# Patient Record
Sex: MALE | Race: WHITE | NOT HISPANIC OR LATINO | Employment: FULL TIME | URBAN - METROPOLITAN AREA
[De-identification: names, ages, dates, MRNs, and addresses within clinical notes are randomized per-mention and may not be internally consistent; named-entity substitution may affect disease eponyms.]

---
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
## 2019-03-19 ENCOUNTER — OFFICE VISIT (OUTPATIENT)
Dept: FAMILY MEDICINE CLINIC | Facility: CLINIC | Age: 19
End: 2019-03-19
Payer: COMMERCIAL

## 2019-03-19 VITALS
WEIGHT: 140.6 LBS | SYSTOLIC BLOOD PRESSURE: 108 MMHG | TEMPERATURE: 99.5 F | HEIGHT: 72 IN | DIASTOLIC BLOOD PRESSURE: 62 MMHG | BODY MASS INDEX: 19.05 KG/M2

## 2019-03-19 DIAGNOSIS — R05.9 COUGH: ICD-10-CM

## 2019-03-19 DIAGNOSIS — R09.81 NASAL CONGESTION: Primary | ICD-10-CM

## 2019-03-19 DIAGNOSIS — J06.9 UPPER RESPIRATORY TRACT INFECTION, UNSPECIFIED TYPE: ICD-10-CM

## 2019-03-19 PROCEDURE — 1036F TOBACCO NON-USER: CPT | Performed by: FAMILY MEDICINE

## 2019-03-19 PROCEDURE — 99213 OFFICE O/P EST LOW 20 MIN: CPT | Performed by: FAMILY MEDICINE

## 2019-03-19 PROCEDURE — 3008F BODY MASS INDEX DOCD: CPT | Performed by: FAMILY MEDICINE

## 2019-03-19 RX ORDER — FLUTICASONE PROPIONATE 50 MCG
2 SPRAY, SUSPENSION (ML) NASAL DAILY
Qty: 1 BOTTLE | Refills: 1 | Status: SHIPPED | OUTPATIENT
Start: 2019-03-19 | End: 2020-11-04

## 2019-03-19 RX ORDER — AZITHROMYCIN 500 MG/1
500 TABLET, FILM COATED ORAL DAILY
Qty: 3 TABLET | Refills: 0 | Status: SHIPPED | OUTPATIENT
Start: 2019-03-19 | End: 2019-03-22

## 2019-03-19 NOTE — LETTER
March 19, 2019     Patient: Suzette Dumont   YOB: 2000   Date of Visit: 3/19/2019       To Whom it May Concern:    Suzette Dumont is under my professional care  He was seen in my office on 3/19/2019  He may return to work on 03/20/2019  If you have any questions or concerns, please don't hesitate to call           Sincerely,          Ugo Gayle DO        CC: No Recipients

## 2019-03-19 NOTE — PROGRESS NOTES
Assessment/Plan:  Chief Complaint   Patient presents with    Cold Like Symptoms    Nasal Congestion    Cough     at night when sleeing     Patient Instructions   Rest and fluids, start abx and fluticasone prn congestion  May use Dimetapp DM cold and cough  No problem-specific Assessment & Plan notes found for this encounter  Diagnoses and all orders for this visit:    Nasal congestion  -     fluticasone (FLONASE) 50 mcg/act nasal spray; 2 sprays into each nostril daily    Cough  -     azithromycin (ZITHROMAX) 500 MG tablet; Take 1 tablet (500 mg total) by mouth daily for 3 days    Upper respiratory tract infection, unspecified type  -     azithromycin (ZITHROMAX) 500 MG tablet; Take 1 tablet (500 mg total) by mouth daily for 3 days          Subjective:      Patient ID: Rexford Kawasaki is a 20 y o  male  Cold Like Symptoms   Nasal Congestion   Cough (at night when sleeing)    Started last Sunday, mother and brother sick also  No cp or sob, or ha  The following portions of the patient's history were reviewed and updated as appropriate: allergies, current medications, past family history, past medical history, past social history, past surgical history and problem list     Review of Systems   Constitutional: Negative  HENT: Positive for congestion  Eyes: Negative  Respiratory: Positive for cough  Cardiovascular: Negative  Gastrointestinal: Negative  Endocrine: Negative  Genitourinary: Negative  Musculoskeletal: Negative  Skin: Negative  Allergic/Immunologic: Negative  Neurological: Negative  Hematological: Negative  Psychiatric/Behavioral: Negative  Objective:      /62   Temp 99 5 °F (37 5 °C)   Ht 5' 11 5" (1 816 m)   Wt 63 8 kg (140 lb 9 6 oz)   BMI 19 34 kg/m²          Physical Exam   Constitutional: He is oriented to person, place, and time  He appears well-developed and well-nourished  HENT:   Head: Normocephalic and atraumatic  Right Ear: External ear normal    Left Ear: External ear normal    Nasal congestion and cough     Eyes: Pupils are equal, round, and reactive to light  Conjunctivae and EOM are normal    Neck: Normal range of motion  Neck supple  Cardiovascular: Normal rate, regular rhythm, normal heart sounds and intact distal pulses  Pulmonary/Chest: Effort normal and breath sounds normal    Cough     Musculoskeletal: Normal range of motion  Neurological: He is alert and oriented to person, place, and time  He has normal reflexes  Skin: Skin is warm and dry  Psychiatric: He has a normal mood and affect   His behavior is normal

## 2019-09-30 ENCOUNTER — DOCUMENTATION (OUTPATIENT)
Dept: FAMILY MEDICINE CLINIC | Facility: CLINIC | Age: 19
End: 2019-09-30

## 2019-09-30 ENCOUNTER — OFFICE VISIT (OUTPATIENT)
Dept: FAMILY MEDICINE CLINIC | Facility: CLINIC | Age: 19
End: 2019-09-30
Payer: COMMERCIAL

## 2019-09-30 VITALS
SYSTOLIC BLOOD PRESSURE: 128 MMHG | HEIGHT: 71 IN | TEMPERATURE: 98.8 F | WEIGHT: 144.8 LBS | DIASTOLIC BLOOD PRESSURE: 72 MMHG | HEART RATE: 64 BPM | OXYGEN SATURATION: 98 % | BODY MASS INDEX: 20.27 KG/M2

## 2019-09-30 DIAGNOSIS — R09.82 PND (POST-NASAL DRIP): ICD-10-CM

## 2019-09-30 DIAGNOSIS — R09.81 NASAL CONGESTION: ICD-10-CM

## 2019-09-30 DIAGNOSIS — J06.9 UPPER RESPIRATORY TRACT INFECTION, UNSPECIFIED TYPE: Primary | ICD-10-CM

## 2019-09-30 PROCEDURE — 99213 OFFICE O/P EST LOW 20 MIN: CPT | Performed by: FAMILY MEDICINE

## 2019-09-30 PROCEDURE — 3008F BODY MASS INDEX DOCD: CPT | Performed by: FAMILY MEDICINE

## 2019-09-30 RX ORDER — AZITHROMYCIN 250 MG/1
TABLET, FILM COATED ORAL
Qty: 6 TABLET | Refills: 0 | Status: SHIPPED | OUTPATIENT
Start: 2019-09-30 | End: 2019-10-05

## 2019-09-30 NOTE — PATIENT INSTRUCTIONS
Rest and fluids and also rec Dimetapp DM cold and cough prn and use abx if not better  Call if any problems

## 2019-09-30 NOTE — PROGRESS NOTES
Assessment/Plan:  Chief Complaint   Patient presents with    Nasal Congestion     Pt states he has had post- nasal drip and states he had lost voice  Offered flu, pt declined      Patient Instructions   Rest and fluids and also rec Dimetapp DM cold and cough prn and use abx if not better  Call if any problems  No problem-specific Assessment & Plan notes found for this encounter  Diagnoses and all orders for this visit:    Upper respiratory tract infection, unspecified type  -     azithromycin (ZITHROMAX) 250 mg tablet; Take 2 tablets today then 1 tablet daily x 4 days    PND (post-nasal drip)    Nasal congestion          Subjective:      Patient ID: Kelsey Goldsmith is a 20 y o  male  Nasal Congestion (Pt states he has had post- nasal drip and states he had lost voice  Offered flu, pt declined ) Girlfriend sick also  No cp or sob, or ha  The following portions of the patient's history were reviewed and updated as appropriate: allergies, current medications, past family history, past medical history, past social history, past surgical history and problem list     Review of Systems   Constitutional: Negative  HENT: Positive for congestion and postnasal drip  Eyes: Negative  Respiratory: Positive for cough  Cardiovascular: Negative  Gastrointestinal: Negative  Endocrine: Negative  Genitourinary: Negative  Musculoskeletal: Negative  Skin: Negative  Allergic/Immunologic: Negative  Neurological: Negative  Hematological: Negative  Psychiatric/Behavioral: Negative  Objective:      /72 (BP Location: Left arm, Patient Position: Sitting, Cuff Size: Adult)   Pulse 64   Temp 98 8 °F (37 1 °C) (Temporal)   Ht 5' 11" (1 803 m)   Wt 65 7 kg (144 lb 12 8 oz)   SpO2 98%   BMI 20 20 kg/m²          Physical Exam   Constitutional: He is oriented to person, place, and time  He appears well-developed and well-nourished     HENT:   Head: Normocephalic and atraumatic  Right Ear: External ear normal    Left Ear: External ear normal    Nasal congestion and PND   Eyes: Pupils are equal, round, and reactive to light  Conjunctivae and EOM are normal    Neck: Normal range of motion  Neck supple  Cardiovascular: Normal rate, regular rhythm, normal heart sounds and intact distal pulses  Pulmonary/Chest: Effort normal and breath sounds normal    Musculoskeletal: Normal range of motion  Neurological: He is alert and oriented to person, place, and time  He has normal reflexes  Skin: Skin is warm and dry  Psychiatric: He has a normal mood and affect   His behavior is normal

## 2019-12-23 ENCOUNTER — TELEPHONE (OUTPATIENT)
Dept: FAMILY MEDICINE CLINIC | Facility: CLINIC | Age: 19
End: 2019-12-23

## 2019-12-23 ENCOUNTER — OFFICE VISIT (OUTPATIENT)
Dept: FAMILY MEDICINE CLINIC | Facility: CLINIC | Age: 19
End: 2019-12-23
Payer: COMMERCIAL

## 2019-12-23 VITALS
TEMPERATURE: 100.3 F | BODY MASS INDEX: 20.58 KG/M2 | OXYGEN SATURATION: 98 % | WEIGHT: 147 LBS | HEIGHT: 71 IN | DIASTOLIC BLOOD PRESSURE: 70 MMHG | RESPIRATION RATE: 16 BRPM | SYSTOLIC BLOOD PRESSURE: 126 MMHG | HEART RATE: 86 BPM

## 2019-12-23 DIAGNOSIS — R51.9 NONINTRACTABLE HEADACHE, UNSPECIFIED CHRONICITY PATTERN, UNSPECIFIED HEADACHE TYPE: ICD-10-CM

## 2019-12-23 DIAGNOSIS — F90.9 ATTENTION DEFICIT HYPERACTIVITY DISORDER (ADHD), UNSPECIFIED ADHD TYPE: ICD-10-CM

## 2019-12-23 DIAGNOSIS — Z28.21 VACCINATION REFUSED BY PATIENT: Primary | ICD-10-CM

## 2019-12-23 DIAGNOSIS — R42 DIZZINESS: ICD-10-CM

## 2019-12-23 PROCEDURE — 1036F TOBACCO NON-USER: CPT | Performed by: FAMILY MEDICINE

## 2019-12-23 PROCEDURE — 99214 OFFICE O/P EST MOD 30 MIN: CPT | Performed by: FAMILY MEDICINE

## 2019-12-23 PROCEDURE — 3008F BODY MASS INDEX DOCD: CPT | Performed by: FAMILY MEDICINE

## 2019-12-23 RX ORDER — MECLIZINE HYDROCHLORIDE 25 MG/1
25 TABLET ORAL DAILY PRN
Qty: 14 TABLET | Refills: 0 | Status: SHIPPED | OUTPATIENT
Start: 2019-12-23 | End: 2020-11-04

## 2019-12-23 RX ORDER — BUTALBITAL, ACETAMINOPHEN AND CAFFEINE 50; 325; 40 MG/1; MG/1; MG/1
1 TABLET ORAL DAILY PRN
Qty: 14 TABLET | Refills: 0 | Status: SHIPPED | OUTPATIENT
Start: 2019-12-23 | End: 2021-04-23

## 2019-12-23 RX ORDER — DEXTROAMPHETAMINE SACCHARATE, AMPHETAMINE ASPARTATE MONOHYDRATE, DEXTROAMPHETAMINE SULFATE AND AMPHETAMINE SULFATE 5; 5; 5; 5 MG/1; MG/1; MG/1; MG/1
20 CAPSULE, EXTENDED RELEASE ORAL EVERY MORNING
Qty: 30 CAPSULE | Refills: 0 | Status: SHIPPED | OUTPATIENT
Start: 2019-12-23 | End: 2020-01-27 | Stop reason: SDUPTHER

## 2019-12-23 NOTE — PATIENT INSTRUCTIONS
Rest and fluids, start adhd med at 20 mg daily and also trial of esgic plus and trial of meclizine and get labs as directed and recheck in 1 month  Stay well hydrated  Recheck in 1 mionth for f-up

## 2019-12-23 NOTE — PROGRESS NOTES
Assessment/Plan:  Chief Complaint   Patient presents with    Headache     times a couple weeks     Dizziness     Pt states that every time he eats he feels like he is going to pass out and the room starts spinning     Medication Management     Pt would like to discuss going back on Adderrall      Patient Instructions   Rest and fluids, start adhd med at 20 mg daily and also trial of esgic plus and trial of meclizine and get labs as directed and recheck in 1 month  Stay well hydrated  Recheck in 1 mionth for f-up  No problem-specific Assessment & Plan notes found for this encounter  Diagnoses and all orders for this visit:    Vaccination refused by patient  -     influenza vaccine, [id number], quadrivalent, 0 5 mL, preservative-free, for adult and pediatric patients 6 mos+ (AFLURIA, FLUARIX, FLULAVAL, FLUZONE)    Attention deficit hyperactivity disorder (ADHD), unspecified ADHD type  -     amphetamine-dextroamphetamine (ADDERALL XR) 20 MG 24 hr capsule; Take 1 capsule (20 mg total) by mouth every morningMax Daily Amount: 20 mg    Nonintractable headache, unspecified chronicity pattern, unspecified headache type  -     butalbital-acetaminophen-caffeine (FIORICET,ESGIC) -40 mg per tablet; Take 1 tablet by mouth daily as needed for headaches  -     Comprehensive metabolic panel; Future  -     CBC and differential; Future    Dizziness  -     Comprehensive metabolic panel; Future  -     CBC and differential; Future  -     meclizine (ANTIVERT) 25 mg tablet; Take 1 tablet (25 mg total) by mouth daily as needed for dizziness          Subjective:      Patient ID: Malachiwkeny Villanueva is a 20 y o  male  Headache (times a couple weeks )  Dizziness (Pt states that every time he eats he feels like he is going to pass out and the room starts spinning )  Medication Management (Pt would like to discuss going back on Adderrall )    Having focus issues at work recently and would like to restart Adderall XR again   No cp or sob  The following portions of the patient's history were reviewed and updated as appropriate: allergies, current medications, past family history, past medical history, past social history, past surgical history and problem list     Review of Systems   Constitutional: Negative  HENT: Negative  Eyes: Negative  Respiratory: Negative  Cardiovascular: Negative  Gastrointestinal: Negative  Endocrine: Negative  Genitourinary: Negative  Musculoskeletal: Negative  Skin: Negative  Allergic/Immunologic: Negative  Neurological: Positive for dizziness and headaches  Hematological: Negative  Psychiatric/Behavioral:        Adhd         Objective:      /70   Pulse 86   Temp 100 3 °F (37 9 °C) (Temporal)   Resp 16   Ht 5' 11" (1 803 m)   Wt 66 7 kg (147 lb)   SpO2 98%   BMI 20 50 kg/m²          Physical Exam   Constitutional: He is oriented to person, place, and time  He appears well-developed and well-nourished  HENT:   Head: Normocephalic and atraumatic  Right Ear: External ear normal    Left Ear: External ear normal    Nose: Nose normal    Mouth/Throat: Oropharynx is clear and moist    Eyes: Pupils are equal, round, and reactive to light  Conjunctivae and EOM are normal    Neck: Normal range of motion  Neck supple  Cardiovascular: Normal rate, regular rhythm, normal heart sounds and intact distal pulses  Pulmonary/Chest: Effort normal and breath sounds normal    Musculoskeletal: Normal range of motion  Neurological: He is alert and oriented to person, place, and time  He has normal reflexes  Skin: Skin is warm and dry  Psychiatric: He has a normal mood and affect   His behavior is normal    adhd

## 2019-12-24 LAB
ALBUMIN SERPL-MCNC: 4.9 G/DL (ref 3.6–5.1)
ALBUMIN/GLOB SERPL: 2.3 (CALC) (ref 1–2.5)
ALP SERPL-CCNC: 77 U/L (ref 40–115)
ALT SERPL-CCNC: 10 U/L (ref 9–46)
AST SERPL-CCNC: 15 U/L (ref 10–40)
BASOPHILS # BLD AUTO: 63 CELLS/UL (ref 0–200)
BASOPHILS NFR BLD AUTO: 0.9 %
BILIRUB SERPL-MCNC: 0.6 MG/DL (ref 0.2–1.2)
BUN SERPL-MCNC: 9 MG/DL (ref 7–25)
BUN/CREAT SERPL: NORMAL (CALC) (ref 6–22)
CALCIUM SERPL-MCNC: 9.6 MG/DL (ref 8.6–10.3)
CHLORIDE SERPL-SCNC: 104 MMOL/L (ref 98–110)
CO2 SERPL-SCNC: 26 MMOL/L (ref 20–32)
CREAT SERPL-MCNC: 0.89 MG/DL (ref 0.6–1.35)
EOSINOPHIL # BLD AUTO: 112 CELLS/UL (ref 15–500)
EOSINOPHIL NFR BLD AUTO: 1.6 %
ERYTHROCYTE [DISTWIDTH] IN BLOOD BY AUTOMATED COUNT: 12.8 % (ref 11–15)
GLOBULIN SER CALC-MCNC: 2.1 G/DL (CALC) (ref 1.9–3.7)
GLUCOSE SERPL-MCNC: 80 MG/DL (ref 65–99)
HCT VFR BLD AUTO: 42.2 % (ref 38.5–50)
HGB BLD-MCNC: 14.5 G/DL (ref 13.2–17.1)
LYMPHOCYTES # BLD AUTO: 1022 CELLS/UL (ref 850–3900)
LYMPHOCYTES NFR BLD AUTO: 14.6 %
MCH RBC QN AUTO: 30.5 PG (ref 27–33)
MCHC RBC AUTO-ENTMCNC: 34.4 G/DL (ref 32–36)
MCV RBC AUTO: 88.8 FL (ref 80–100)
MONOCYTES # BLD AUTO: 511 CELLS/UL (ref 200–950)
MONOCYTES NFR BLD AUTO: 7.3 %
NEUTROPHILS # BLD AUTO: 5292 CELLS/UL (ref 1500–7800)
NEUTROPHILS NFR BLD AUTO: 75.6 %
PLATELET # BLD AUTO: 202 THOUSAND/UL (ref 140–400)
PMV BLD REES-ECKER: 10.2 FL (ref 7.5–12.5)
POTASSIUM SERPL-SCNC: 4.1 MMOL/L (ref 3.5–5.3)
PROT SERPL-MCNC: 7 G/DL (ref 6.1–8.1)
RBC # BLD AUTO: 4.75 MILLION/UL (ref 4.2–5.8)
SL AMB EGFR AFRICAN AMERICAN: 143 ML/MIN/1.73M2
SL AMB EGFR NON AFRICAN AMERICAN: 123 ML/MIN/1.73M2
SODIUM SERPL-SCNC: 140 MMOL/L (ref 135–146)
WBC # BLD AUTO: 7 THOUSAND/UL (ref 3.8–10.8)

## 2020-01-16 ENCOUNTER — OFFICE VISIT (OUTPATIENT)
Dept: FAMILY MEDICINE CLINIC | Facility: CLINIC | Age: 20
End: 2020-01-16
Payer: COMMERCIAL

## 2020-01-16 VITALS
WEIGHT: 139.8 LBS | HEIGHT: 72 IN | TEMPERATURE: 101.1 F | OXYGEN SATURATION: 97 % | HEART RATE: 87 BPM | RESPIRATION RATE: 17 BRPM | SYSTOLIC BLOOD PRESSURE: 136 MMHG | DIASTOLIC BLOOD PRESSURE: 76 MMHG | BODY MASS INDEX: 18.93 KG/M2

## 2020-01-16 DIAGNOSIS — R53.83 FATIGUE, UNSPECIFIED TYPE: ICD-10-CM

## 2020-01-16 DIAGNOSIS — R50.9 FEVER, UNSPECIFIED FEVER CAUSE: ICD-10-CM

## 2020-01-16 DIAGNOSIS — R68.89 FLU-LIKE SYMPTOMS: ICD-10-CM

## 2020-01-16 DIAGNOSIS — R52 BODY ACHES: ICD-10-CM

## 2020-01-16 DIAGNOSIS — R05.9 COUGH: Primary | ICD-10-CM

## 2020-01-16 PROCEDURE — 1036F TOBACCO NON-USER: CPT | Performed by: FAMILY MEDICINE

## 2020-01-16 PROCEDURE — 3008F BODY MASS INDEX DOCD: CPT | Performed by: FAMILY MEDICINE

## 2020-01-16 PROCEDURE — 99214 OFFICE O/P EST MOD 30 MIN: CPT | Performed by: FAMILY MEDICINE

## 2020-01-16 RX ORDER — AZITHROMYCIN 250 MG/1
TABLET, FILM COATED ORAL
Qty: 6 TABLET | Refills: 0 | Status: SHIPPED | OUTPATIENT
Start: 2020-01-16 | End: 2020-01-21

## 2020-01-16 RX ORDER — OSELTAMIVIR PHOSPHATE 75 MG/1
75 CAPSULE ORAL EVERY 12 HOURS SCHEDULED
Qty: 10 CAPSULE | Refills: 0 | Status: SHIPPED | OUTPATIENT
Start: 2020-01-16 | End: 2020-01-21

## 2020-01-16 NOTE — PATIENT INSTRUCTIONS
Rest and fluids, start abx and Tamiflu and also rec Dimetapp DM cold and cough prn  Tylenol or advil prn body aches or fever  Call if worse

## 2020-01-16 NOTE — PROGRESS NOTES
Assessment/Plan:  Chief Complaint   Patient presents with    Cold Like Symptoms     Pt c/o body aches and sweats followed by fatigue x4 days  Pt states he did have nausea and vomiting which has subsided  Patient Instructions   Rest and fluids, start abx and Tamiflu and also rec Dimetapp DM cold and cough prn  Tylenol or advil prn body aches or fever  Call if worse  No problem-specific Assessment & Plan notes found for this encounter  Diagnoses and all orders for this visit:    Cough  -     azithromycin (ZITHROMAX) 250 mg tablet; Take 2 tablets today then 1 tablet daily x 4 days    Flu-like symptoms  -     oseltamivir (TAMIFLU) 75 mg capsule; Take 1 capsule (75 mg total) by mouth every 12 (twelve) hours for 5 days    Fever, unspecified fever cause    Fatigue, unspecified type    Body aches          Subjective:      Patient ID: Rufus Pack is a 23 y o  male  Cold Like Symptoms (Pt c/o body aches and sweats followed by fatigue x4 days  Pt states he did have nausea and vomiting which has subsided  ) Flu like symptoms and cough and fever, body aches  The following portions of the patient's history were reviewed and updated as appropriate: allergies, current medications, past family history, past medical history, past social history, past surgical history and problem list     Review of Systems   Constitutional: Positive for fatigue and fever  Sweats   HENT: Negative  Eyes: Negative  Respiratory: Positive for cough  Cardiovascular: Negative  Gastrointestinal: Negative  Endocrine: Negative  Genitourinary: Negative  Musculoskeletal:        Body aches   Skin: Negative  Allergic/Immunologic: Negative  Neurological: Negative  Hematological: Negative  Psychiatric/Behavioral: Negative            Objective:      /76 (BP Location: Left arm, Patient Position: Sitting, Cuff Size: Adult)   Pulse 87   Temp (!) 101 1 °F (38 4 °C) (Temporal)   Resp 17   Ht 5' 11 65" (1 82 m)   Wt 63 4 kg (139 lb 12 8 oz)   SpO2 97%   BMI 19 14 kg/m²          Physical Exam   Constitutional: He is oriented to person, place, and time  He appears well-developed and well-nourished  fatigued   HENT:   Head: Normocephalic and atraumatic  Right Ear: External ear normal    Left Ear: External ear normal    Nose: Nose normal    Mouth/Throat: Oropharynx is clear and moist    Eyes: Pupils are equal, round, and reactive to light  Conjunctivae and EOM are normal    Neck: Normal range of motion  Neck supple  Cardiovascular: Normal rate, regular rhythm, normal heart sounds and intact distal pulses  Pulmonary/Chest: Effort normal and breath sounds normal    cough   Musculoskeletal: Normal range of motion  Neurological: He is alert and oriented to person, place, and time  He has normal reflexes  Skin: Skin is warm and dry  Psychiatric: He has a normal mood and affect   His behavior is normal

## 2020-01-27 DIAGNOSIS — F90.9 ATTENTION DEFICIT HYPERACTIVITY DISORDER (ADHD), UNSPECIFIED ADHD TYPE: ICD-10-CM

## 2020-01-27 RX ORDER — DEXTROAMPHETAMINE SACCHARATE, AMPHETAMINE ASPARTATE MONOHYDRATE, DEXTROAMPHETAMINE SULFATE AND AMPHETAMINE SULFATE 5; 5; 5; 5 MG/1; MG/1; MG/1; MG/1
20 CAPSULE, EXTENDED RELEASE ORAL EVERY MORNING
Qty: 30 CAPSULE | Refills: 0 | Status: SHIPPED | OUTPATIENT
Start: 2020-01-27 | End: 2020-02-27 | Stop reason: SDUPTHER

## 2020-02-27 DIAGNOSIS — F90.9 ATTENTION DEFICIT HYPERACTIVITY DISORDER (ADHD), UNSPECIFIED ADHD TYPE: ICD-10-CM

## 2020-02-27 RX ORDER — DEXTROAMPHETAMINE SACCHARATE, AMPHETAMINE ASPARTATE MONOHYDRATE, DEXTROAMPHETAMINE SULFATE AND AMPHETAMINE SULFATE 5; 5; 5; 5 MG/1; MG/1; MG/1; MG/1
20 CAPSULE, EXTENDED RELEASE ORAL EVERY MORNING
Qty: 30 CAPSULE | Refills: 0 | Status: SHIPPED | OUTPATIENT
Start: 2020-02-27 | End: 2020-03-27 | Stop reason: SDUPTHER

## 2020-03-04 ENCOUNTER — OFFICE VISIT (OUTPATIENT)
Dept: FAMILY MEDICINE CLINIC | Facility: CLINIC | Age: 20
End: 2020-03-04
Payer: COMMERCIAL

## 2020-03-04 VITALS
HEART RATE: 73 BPM | BODY MASS INDEX: 18.26 KG/M2 | OXYGEN SATURATION: 99 % | HEIGHT: 72 IN | TEMPERATURE: 98.8 F | WEIGHT: 134.8 LBS | SYSTOLIC BLOOD PRESSURE: 122 MMHG | DIASTOLIC BLOOD PRESSURE: 76 MMHG | RESPIRATION RATE: 18 BRPM

## 2020-03-04 DIAGNOSIS — F90.9 ATTENTION DEFICIT HYPERACTIVITY DISORDER (ADHD), UNSPECIFIED ADHD TYPE: ICD-10-CM

## 2020-03-04 DIAGNOSIS — M79.642 HAND PAIN, LEFT: ICD-10-CM

## 2020-03-04 DIAGNOSIS — R42 DIZZINESS: Primary | ICD-10-CM

## 2020-03-04 DIAGNOSIS — J06.9 UPPER RESPIRATORY TRACT INFECTION, UNSPECIFIED TYPE: ICD-10-CM

## 2020-03-04 DIAGNOSIS — Z00.00 HEALTH CARE MAINTENANCE: ICD-10-CM

## 2020-03-04 DIAGNOSIS — R19.7 DIARRHEA, UNSPECIFIED TYPE: ICD-10-CM

## 2020-03-04 PROCEDURE — 99395 PREV VISIT EST AGE 18-39: CPT | Performed by: FAMILY MEDICINE

## 2020-03-04 RX ORDER — PREDNISONE 10 MG/1
TABLET ORAL
Qty: 21 TABLET | Refills: 0 | Status: SHIPPED | OUTPATIENT
Start: 2020-03-04 | End: 2020-11-04

## 2020-03-04 RX ORDER — MECLIZINE HYDROCHLORIDE 25 MG/1
25 TABLET ORAL DAILY PRN
Qty: 30 TABLET | Refills: 0 | Status: SHIPPED | OUTPATIENT
Start: 2020-03-04 | End: 2020-07-19

## 2020-03-04 RX ORDER — AZITHROMYCIN 250 MG/1
TABLET, FILM COATED ORAL
Qty: 6 TABLET | Refills: 0 | Status: SHIPPED | OUTPATIENT
Start: 2020-03-04 | End: 2020-03-09

## 2020-03-04 NOTE — PATIENT INSTRUCTIONS
Here for general PE and left hand pain and swelling as well as URI with nasal congestion and dizziness and diarhhea  Start prednisone and abx as directed and use meclizine once daily prn dizziness  May use Dimetapp DM cold and cough prn URI complaints and Pepto Bismol prn diarrhea  Recheck yearly  Recent CMP and CBC wnl  Pt  Prefers to wait for flu shot in fall 2020  Call office if interested in flu shot for this season  Monitor dizziness and call if not better in 1 week with hand pain or dizziness

## 2020-03-04 NOTE — PROGRESS NOTES
Assessment/Plan:  Chief Complaint   Patient presents with    Physical Exam     Pt presents for a physical exam  Pt states he would like to discuss L/hand swelling and redness   Dizziness     Pt c/o dizziness since Monday  Patient Instructions   Here for general PE and left hand pain and swelling as well as URI with nasal congestion and dizziness and diarhhea  Start prednisone and abx as directed and use meclizine once daily prn dizziness  May use Dimetapp DM cold and cough prn URI complaints and Pepto Bismol prn diarrhea  Recheck yearly  Recent CMP and CBC wnl  Pt  Prefers to wait for flu shot in fall 2020  Call office if interested in flu shot for this season  Monitor dizziness and call if not better in 1 week with hand pain or dizziness  No problem-specific Assessment & Plan notes found for this encounter  Diagnoses and all orders for this visit:    Dizziness  -     predniSONE 10 mg tablet; Take 60 mg po day#1, 50 mg po day#2, 40 mg po day#3, 30 mg po day#4, 20 mg po day#5m and 10 mg po day#6  -     meclizine (ANTIVERT) 25 mg tablet; Take 1 tablet (25 mg total) by mouth daily as needed for dizziness    Health care maintenance    Hand pain, left  -     XR hand 3+ vw left; Future  -     predniSONE 10 mg tablet; Take 60 mg po day#1, 50 mg po day#2, 40 mg po day#3, 30 mg po day#4, 20 mg po day#5m and 10 mg po day#6    Upper respiratory tract infection, unspecified type  -     predniSONE 10 mg tablet; Take 60 mg po day#1, 50 mg po day#2, 40 mg po day#3, 30 mg po day#4, 20 mg po day#5m and 10 mg po day#6  -     azithromycin (ZITHROMAX) 250 mg tablet; Take 2 tablets today then 1 tablet daily x 4 days    Diarrhea, unspecified type    Attention deficit hyperactivity disorder (ADHD), unspecified ADHD type          Subjective:      Patient ID: Yasmany Calvo is a 23 y o  male  Physical Exam (Pt presents for a physical exam  Pt states he would like to discuss L/hand swelling and redness  )  Dizziness (Pt c/o dizziness since Monday  )    Has not taken anything for dizziness  Eating and drinking  No fever  No vomiting and has had some diarrhea  Brother is sick  Pt  Has had some nasal congestion and runny nose  Used Flonase but did not help  Left hand pain when making a fist, denies trauma  It does tingle in thenar area of left hand  No neck pain or left arm pain  ADHD med helps and takes daily except weekends  Has no side effects except possible insomnia but unsure  The following portions of the patient's history were reviewed and updated as appropriate: allergies, current medications, past family history, past medical history, past social history, past surgical history and problem list     Review of Systems   Constitutional: Negative  HENT: Positive for congestion and rhinorrhea  Eyes: Negative  Respiratory: Negative  Cardiovascular: Negative  Gastrointestinal: Negative  Endocrine: Negative  Genitourinary: Negative  Musculoskeletal:        Left palm painful and tingling in area near base of left thumb  This started 2 days ago   Skin: Negative  Allergic/Immunologic: Negative  Neurological: Positive for dizziness  Hematological: Negative  Psychiatric/Behavioral:        ADHD stable         Objective:      /76 (BP Location: Left arm, Patient Position: Sitting, Cuff Size: Adult)   Pulse 73   Temp 98 8 °F (37 1 °C) (Temporal)   Resp 18   Ht 6' 0 24" (1 835 m)   Wt 61 1 kg (134 lb 12 8 oz)   SpO2 99%   BMI 18 16 kg/m²          Physical Exam   Constitutional: He is oriented to person, place, and time  He appears well-developed and well-nourished  HENT:   Head: Normocephalic and atraumatic  Right Ear: External ear normal    Left Ear: External ear normal    Nose: Nose normal    Mouth/Throat: Oropharynx is clear and moist    Runny nose and pnd   Eyes: Pupils are equal, round, and reactive to light   Conjunctivae and EOM are normal    Neck: Normal range of motion  Neck supple  Cardiovascular: Normal rate, regular rhythm, normal heart sounds and intact distal pulses  Pulmonary/Chest: Effort normal and breath sounds normal    Abdominal: Soft  Bowel sounds are normal    Musculoskeletal: Normal range of motion  Left palm thenar pain and tingling, worse with making a fist     Neurological: He is alert and oriented to person, place, and time  He has normal reflexes  Skin: Skin is warm and dry  Psychiatric: He has a normal mood and affect   His behavior is normal    ADHD stable

## 2020-03-26 ENCOUNTER — DOCUMENTATION (OUTPATIENT)
Dept: FAMILY MEDICINE CLINIC | Facility: CLINIC | Age: 20
End: 2020-03-26

## 2020-03-26 ENCOUNTER — TELEMEDICINE (OUTPATIENT)
Dept: FAMILY MEDICINE CLINIC | Facility: CLINIC | Age: 20
End: 2020-03-26
Payer: COMMERCIAL

## 2020-03-26 DIAGNOSIS — H10.9 CONJUNCTIVITIS OF RIGHT EYE, UNSPECIFIED CONJUNCTIVITIS TYPE: Primary | ICD-10-CM

## 2020-03-26 PROCEDURE — 99213 OFFICE O/P EST LOW 20 MIN: CPT | Performed by: FAMILY MEDICINE

## 2020-03-26 RX ORDER — TOBRAMYCIN 3 MG/ML
1 SOLUTION/ DROPS OPHTHALMIC
Qty: 5 ML | Refills: 0 | Status: SHIPPED | OUTPATIENT
Start: 2020-03-26 | End: 2020-03-31

## 2020-03-26 NOTE — PROGRESS NOTES
Virtual Regular Visit    Problem List Items Addressed This Visit     None      Visit Diagnoses     Conjunctivitis of right eye, unspecified conjunctivitis type    -  Primary    Relevant Medications    tobramycin (TOBREX) 0 3 % SOLN               Reason for visit is for right pink eye and used eye drops and ibuprofen, artificial tears  Has only been wearing glasses  This started Monday and not better  Encounter provider Adalberto Lua DO    Provider located at [address] [place] [phone]      Recent Visits  No visits were found meeting these conditions  Showing recent visits within past 7 days and meeting all other requirements     Today's Visits  Date Type Provider Dept   03/26/20 Telemedicine Adalberto Lua DO [facility] today's visits and meeting all other requirements     Future Appointments  Date Type Provider Dept   03/26/20 Telemedicine Adalberto Lua DO [facility]   Showing future appointments within next 150 days and meeting all other requirements        After connecting through [employer], the patient was identified by name and date of birth  Dorian Mcgee was informed that this is a telemedicine visit and that the visit is being conducted through [employer] and patient was informed that this is not a secure, HIPAA-complaint platform  he agrees to proceed  which may not be secure and therefore, might not be HIPAA-compliant  My office door was closed  No one else was in the room  He acknowledged consent and understanding of privacy and security of the video platform  The patient has agreed to participate and understands they can discontinue the visit at any time  Subjective  Dorian Mcgee is a 23 y o  male who has right pink eye since this past Monday         Past Medical History:   Diagnosis Date    ADHD        No past surgical history on file      Current Outpatient Medications   Medication Sig Dispense Refill    amphetamine-dextroamphetamine (ADDERALL XR) 20 MG 24 hr capsule Take 1 capsule (20 mg total) by mouth every morningMax Daily Amount: 20 mg 30 capsule 0    butalbital-acetaminophen-caffeine (FIORICET,ESGIC) -40 mg per tablet Take 1 tablet by mouth daily as needed for headaches 14 tablet 0    fluticasone (FLONASE) 50 mcg/act nasal spray 2 sprays into each nostril daily (Patient not taking: Reported on 9/30/2019) 1 Bottle 1    meclizine (ANTIVERT) 25 mg tablet Take 1 tablet (25 mg total) by mouth daily as needed for dizziness (Patient not taking: Reported on 1/16/2020) 14 tablet 0    meclizine (ANTIVERT) 25 mg tablet Take 1 tablet (25 mg total) by mouth daily as needed for dizziness 30 tablet 0    predniSONE 10 mg tablet Take 60 mg po day#1, 50 mg po day#2, 40 mg po day#3, 30 mg po day#4, 20 mg po day#5m and 10 mg po day#6 21 tablet 0    tobramycin (TOBREX) 0 3 % SOLN Administer 1 drop to the right eye every 4 (four) hours while awake for 5 days 5 mL 0     No current facility-administered medications for this visit  No Known Allergies    Review of Systems   Right pink eye, no cp or sob, or ha, no fever  Physical Exam     Right eye conjunctivitis  In NAD and AAO times 3  I spent 15 minutes with the patient during this visit  Patient Instructions   Here for right pink eye and will start tobrex eye drops and work excuse for yesterday and today and rest of the week  Faxed to [phone] his place of work  Wash hands and also practice social distancing and also call if any cough or sob and fever greater than 100 4  No known exposure to COVID 19  Call if any problems

## 2020-03-26 NOTE — PATIENT INSTRUCTIONS
Here for right pink eye and will start tobrex eye drops and work excuse for yesterday and today and rest of the week  Faxed to [phone] his place of work  Wash hands and also practice social distancing and also call if any cough or sob and fever greater than 100 4  No known exposure to COVID 19  Call if any problems

## 2020-03-27 DIAGNOSIS — F90.9 ATTENTION DEFICIT HYPERACTIVITY DISORDER (ADHD), UNSPECIFIED ADHD TYPE: ICD-10-CM

## 2020-03-27 RX ORDER — DEXTROAMPHETAMINE SACCHARATE, AMPHETAMINE ASPARTATE MONOHYDRATE, DEXTROAMPHETAMINE SULFATE AND AMPHETAMINE SULFATE 5; 5; 5; 5 MG/1; MG/1; MG/1; MG/1
20 CAPSULE, EXTENDED RELEASE ORAL EVERY MORNING
Qty: 30 CAPSULE | Refills: 0 | Status: SHIPPED | OUTPATIENT
Start: 2020-03-27 | End: 2020-04-27 | Stop reason: SDUPTHER

## 2020-03-27 NOTE — TELEPHONE ENCOUNTER
Pt left message requesting amphetamine-dextroamphetamine (ADDERALL XR) 20 MG 24 hr capsule  PMED not reviewed, no access      Last visit- 03/04/20  Next visit- 09/10/20  Last refill- 02/27/20

## 2020-03-30 ENCOUNTER — TELEMEDICINE (OUTPATIENT)
Dept: FAMILY MEDICINE CLINIC | Facility: CLINIC | Age: 20
End: 2020-03-30
Payer: COMMERCIAL

## 2020-03-30 DIAGNOSIS — H10.9 CONJUNCTIVITIS OF RIGHT EYE, UNSPECIFIED CONJUNCTIVITIS TYPE: Primary | ICD-10-CM

## 2020-03-30 PROCEDURE — 99213 OFFICE O/P EST LOW 20 MIN: CPT | Performed by: FAMILY MEDICINE

## 2020-03-30 NOTE — PATIENT INSTRUCTIONS
Right eye conjunctivitis resolved after use of abx eye drops  He will need a note to return to work  Pt  May return to work today  Note needs to be faxed to his employer at [phone]  Call if any problems

## 2020-03-30 NOTE — PROGRESS NOTES
Virtual Regular Visit    Problem List Items Addressed This Visit     None      Visit Diagnoses     Conjunctivitis of right eye, unspecified conjunctivitis type    -  Primary               Reason for visit is for recheck of right eye conjunctivitis  Encounter provider Omer Forman DO    Provider located at [address] [place] [id number]      Recent Visits  Date Type Provider Dept   03/26/20 Telemedicine Omer Forman DO [facility] recent visits within past 7 days and meeting all other requirements     Today's Visits  Date Type Provider Dept   03/30/20 Telemedicine Omer Forman DO [facility] today's visits and meeting all other requirements     Future Appointments  Date Type Provider Dept   03/30/20 Telemedicine Omer Forman DO [facility]   Showing future appointments within next 150 days and meeting all other requirements        The patient was identified by name and date of birth  Deion Villanueva was informed that this is a telemedicine visit and that the visit is being conducted through [address] [place] [address] and patient was informed that this is not a secure, HIPAA-complaint platform  he agrees to proceed     My office door was closed  No one else was in the room  He acknowledged consent and understanding of privacy and security of the video platform  The patient has agreed to participate and understands they can discontinue the visit at any time  Patient is aware this is a billable service  Subjective  Deion Villanueva is a 23 y o  male who needs note to return to work and satates he used his eye drops for pink eye and is resolved  Denies sob, cough or fever  Past Medical History:   Diagnosis Date    ADHD        No past surgical history on file      Current Outpatient Medications   Medication Sig Dispense Refill    amphetamine-dextroamphetamine (ADDERALL XR) 20 MG 24 hr capsule Take 1 capsule (20 mg total) by mouth every morningMax Daily Amount: 20 mg 30 capsule 0    butalbital-acetaminophen-caffeine (FIORICET,ESGIC) -40 mg per tablet Take 1 tablet by mouth daily as needed for headaches 14 tablet 0    fluticasone (FLONASE) 50 mcg/act nasal spray 2 sprays into each nostril daily (Patient not taking: Reported on 9/30/2019) 1 Bottle 1    meclizine (ANTIVERT) 25 mg tablet Take 1 tablet (25 mg total) by mouth daily as needed for dizziness (Patient not taking: Reported on 1/16/2020) 14 tablet 0    meclizine (ANTIVERT) 25 mg tablet Take 1 tablet (25 mg total) by mouth daily as needed for dizziness 30 tablet 0    predniSONE 10 mg tablet Take 60 mg po day#1, 50 mg po day#2, 40 mg po day#3, 30 mg po day#4, 20 mg po day#5m and 10 mg po day#6 21 tablet 0    tobramycin (TOBREX) 0 3 % SOLN Administer 1 drop to the right eye every 4 (four) hours while awake for 5 days 5 mL 0     No current facility-administered medications for this visit  No Known Allergies    Review of Systems   Right eye - no conjunctivitis, no cough, no sob, no fever  Physical Exam   Right eye wnl, no conjunctivitis and no cough and no sob, no acute respiratory distress  I spent 15 minutes with the patient during this visit  Patient Instructions   Right eye conjunctivitis resolved after use of abx eye drops  He will need a note to return to work  Pt  May return to work today  Note needs to be faxed to his employer at [phone]  Call if any problems

## 2020-04-27 DIAGNOSIS — F90.9 ATTENTION DEFICIT HYPERACTIVITY DISORDER (ADHD), UNSPECIFIED ADHD TYPE: ICD-10-CM

## 2020-04-27 RX ORDER — DEXTROAMPHETAMINE SACCHARATE, AMPHETAMINE ASPARTATE MONOHYDRATE, DEXTROAMPHETAMINE SULFATE AND AMPHETAMINE SULFATE 5; 5; 5; 5 MG/1; MG/1; MG/1; MG/1
20 CAPSULE, EXTENDED RELEASE ORAL EVERY MORNING
Qty: 30 CAPSULE | Refills: 0 | Status: SHIPPED | OUTPATIENT
Start: 2020-04-27 | End: 2020-05-27 | Stop reason: SDUPTHER

## 2020-05-27 DIAGNOSIS — F90.9 ATTENTION DEFICIT HYPERACTIVITY DISORDER (ADHD), UNSPECIFIED ADHD TYPE: ICD-10-CM

## 2020-05-27 RX ORDER — DEXTROAMPHETAMINE SACCHARATE, AMPHETAMINE ASPARTATE MONOHYDRATE, DEXTROAMPHETAMINE SULFATE AND AMPHETAMINE SULFATE 5; 5; 5; 5 MG/1; MG/1; MG/1; MG/1
20 CAPSULE, EXTENDED RELEASE ORAL EVERY MORNING
Qty: 30 CAPSULE | Refills: 0 | Status: SHIPPED | OUTPATIENT
Start: 2020-05-27 | End: 2020-06-29 | Stop reason: SDUPTHER

## 2020-06-29 DIAGNOSIS — F90.9 ATTENTION DEFICIT HYPERACTIVITY DISORDER (ADHD), UNSPECIFIED ADHD TYPE: ICD-10-CM

## 2020-06-29 RX ORDER — DEXTROAMPHETAMINE SACCHARATE, AMPHETAMINE ASPARTATE MONOHYDRATE, DEXTROAMPHETAMINE SULFATE AND AMPHETAMINE SULFATE 5; 5; 5; 5 MG/1; MG/1; MG/1; MG/1
20 CAPSULE, EXTENDED RELEASE ORAL EVERY MORNING
Qty: 30 CAPSULE | Refills: 0 | Status: SHIPPED | OUTPATIENT
Start: 2020-06-29 | End: 2020-07-28 | Stop reason: SDUPTHER

## 2020-07-18 DIAGNOSIS — R42 DIZZINESS: ICD-10-CM

## 2020-07-19 RX ORDER — MECLIZINE HYDROCHLORIDE 25 MG/1
25 TABLET ORAL DAILY PRN
Qty: 30 TABLET | Refills: 0 | Status: SHIPPED | OUTPATIENT
Start: 2020-07-19 | End: 2020-11-04

## 2020-07-21 ENCOUNTER — OFFICE VISIT (OUTPATIENT)
Dept: FAMILY MEDICINE CLINIC | Facility: CLINIC | Age: 20
End: 2020-07-21
Payer: COMMERCIAL

## 2020-07-21 VITALS
DIASTOLIC BLOOD PRESSURE: 70 MMHG | BODY MASS INDEX: 19.99 KG/M2 | OXYGEN SATURATION: 98 % | HEART RATE: 83 BPM | HEIGHT: 73 IN | WEIGHT: 150.8 LBS | TEMPERATURE: 98.4 F | SYSTOLIC BLOOD PRESSURE: 120 MMHG

## 2020-07-21 DIAGNOSIS — M25.512 ACUTE PAIN OF LEFT SHOULDER: Primary | ICD-10-CM

## 2020-07-21 PROCEDURE — 3008F BODY MASS INDEX DOCD: CPT | Performed by: FAMILY MEDICINE

## 2020-07-21 PROCEDURE — 1036F TOBACCO NON-USER: CPT | Performed by: FAMILY MEDICINE

## 2020-07-21 PROCEDURE — 99213 OFFICE O/P EST LOW 20 MIN: CPT | Performed by: FAMILY MEDICINE

## 2020-07-21 RX ORDER — MELOXICAM 7.5 MG/1
7.5 TABLET ORAL DAILY PRN
Qty: 30 TABLET | Refills: 0 | Status: SHIPPED | OUTPATIENT
Start: 2020-07-21 | End: 2020-08-13

## 2020-07-21 NOTE — PATIENT INSTRUCTIONS
Rest left shoulder and start meloxicam as directed prn pain and check left shoulder xray  Consult [facility]

## 2020-07-21 NOTE — PROGRESS NOTES
Assessment/Plan:  Chief Complaint   Patient presents with    Shoulder Pain     L shoulder pain, couple of months, pt states that when he moves his arm he gets some pain, no trauma     Patient Instructions   Rest left shoulder and start meloxicam as directed prn pain and check left shoulder xray  Consult [facility]  No problem-specific Assessment & Plan notes found for this encounter  Diagnoses and all orders for this visit:    Acute pain of left shoulder  -     meloxicam (MOBIC) 7 5 mg tablet; Take 1 tablet (7 5 mg total) by mouth daily as needed for moderate pain  -     XR shoulder 2+ vw left; Future          Subjective:      Patient ID: Anna Kinney is a 23 y o  male  Shoulder Pain (L shoulder pain, couple of months, pt states that when he moves his arm he gets some pain, no trauma) 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 and icy hot and ibuprofen  The following portions of the patient's history were reviewed and updated as appropriate: allergies, current medications, past family history, past medical history, past social history, past surgical history and problem list     Review of Systems   Constitutional: Negative  HENT: Negative  Eyes: Negative  Respiratory: Negative  Cardiovascular: Negative  Gastrointestinal: Negative  Endocrine: Negative  Genitourinary: Negative  Musculoskeletal:        Left shoulder pain worse when overhead   Skin: Negative  Allergic/Immunologic: Negative  Neurological: Negative  Hematological: Negative  Psychiatric/Behavioral: Negative  Objective:      /70 (BP Location: Left arm, Patient Position: Sitting, Cuff Size: Standard)   Pulse 83   Temp 98 4 °F (36 9 °C)   Ht 6' 1" (1 854 m)   Wt 68 4 kg (150 lb 12 8 oz)   SpO2 98%   BMI 19 90 kg/m²          Physical Exam   Constitutional: He is oriented to person, place, and time  He appears well-developed and well-nourished     HENT:   Head: Normocephalic and atraumatic  Eyes: Pupils are equal, round, and reactive to light  Conjunctivae and EOM are normal    Neck: Normal range of motion  Neck supple  Cardiovascular: Normal rate, regular rhythm, normal heart sounds and intact distal pulses  Pulmonary/Chest: Effort normal and breath sounds normal    Musculoskeletal: Normal range of motion  Left shoulder pain worse with overhead arm movement   Neurological: He is alert and oriented to person, place, and time  He has normal reflexes  Skin: Skin is warm and dry  Psychiatric: He has a normal mood and affect   His behavior is normal

## 2020-07-23 ENCOUNTER — HOSPITAL ENCOUNTER (OUTPATIENT)
Dept: RADIOLOGY | Facility: HOSPITAL | Age: 20
Discharge: HOME | End: 2020-07-23
Payer: COMMERCIAL

## 2020-07-23 ENCOUNTER — APPOINTMENT (OUTPATIENT)
Dept: RADIOLOGY | Facility: MEDICAL CENTER | Age: 20
End: 2020-07-23
Payer: COMMERCIAL

## 2020-07-23 DIAGNOSIS — M25.512 ACUTE PAIN OF LEFT SHOULDER: ICD-10-CM

## 2020-07-23 PROCEDURE — 73030 X-RAY EXAM OF SHOULDER: CPT

## 2020-07-28 DIAGNOSIS — F90.9 ATTENTION DEFICIT HYPERACTIVITY DISORDER (ADHD), UNSPECIFIED ADHD TYPE: ICD-10-CM

## 2020-07-28 RX ORDER — DEXTROAMPHETAMINE SACCHARATE, AMPHETAMINE ASPARTATE MONOHYDRATE, DEXTROAMPHETAMINE SULFATE AND AMPHETAMINE SULFATE 5; 5; 5; 5 MG/1; MG/1; MG/1; MG/1
20 CAPSULE, EXTENDED RELEASE ORAL EVERY MORNING
Qty: 30 CAPSULE | Refills: 0 | Status: SHIPPED | OUTPATIENT
Start: 2020-07-28 | End: 2020-08-28 | Stop reason: SDUPTHER

## 2020-08-13 DIAGNOSIS — M25.512 ACUTE PAIN OF LEFT SHOULDER: ICD-10-CM

## 2020-08-13 RX ORDER — MELOXICAM 7.5 MG/1
7.5 TABLET ORAL DAILY PRN
Qty: 30 TABLET | Refills: 0 | Status: SHIPPED | OUTPATIENT
Start: 2020-08-13 | End: 2020-11-04

## 2020-08-28 DIAGNOSIS — F90.9 ATTENTION DEFICIT HYPERACTIVITY DISORDER (ADHD), UNSPECIFIED ADHD TYPE: ICD-10-CM

## 2020-08-28 RX ORDER — DEXTROAMPHETAMINE SACCHARATE, AMPHETAMINE ASPARTATE MONOHYDRATE, DEXTROAMPHETAMINE SULFATE AND AMPHETAMINE SULFATE 5; 5; 5; 5 MG/1; MG/1; MG/1; MG/1
20 CAPSULE, EXTENDED RELEASE ORAL EVERY MORNING
Qty: 30 CAPSULE | Refills: 0 | Status: SHIPPED | OUTPATIENT
Start: 2020-08-28 | End: 2020-09-30 | Stop reason: SDUPTHER

## 2020-08-28 NOTE — TELEPHONE ENCOUNTER
Pt needs a refill of his ADDERALL pt uses the [facility] in [place] on [address]      PDMP checked:    07/28/2020  1  07/28/2020  ADDERALL XR 20 MG CAPSULE  30 0  30  CR EST

## 2020-09-30 DIAGNOSIS — F90.9 ATTENTION DEFICIT HYPERACTIVITY DISORDER (ADHD), UNSPECIFIED ADHD TYPE: ICD-10-CM

## 2020-09-30 RX ORDER — DEXTROAMPHETAMINE SACCHARATE, AMPHETAMINE ASPARTATE MONOHYDRATE, DEXTROAMPHETAMINE SULFATE AND AMPHETAMINE SULFATE 5; 5; 5; 5 MG/1; MG/1; MG/1; MG/1
20 CAPSULE, EXTENDED RELEASE ORAL EVERY MORNING
Qty: 30 CAPSULE | Refills: 0 | Status: SHIPPED | OUTPATIENT
Start: 2020-09-30 | End: 2020-10-30 | Stop reason: SDUPTHER

## 2020-10-30 DIAGNOSIS — F90.9 ATTENTION DEFICIT HYPERACTIVITY DISORDER (ADHD), UNSPECIFIED ADHD TYPE: ICD-10-CM

## 2020-10-30 RX ORDER — DEXTROAMPHETAMINE SACCHARATE, AMPHETAMINE ASPARTATE MONOHYDRATE, DEXTROAMPHETAMINE SULFATE AND AMPHETAMINE SULFATE 5; 5; 5; 5 MG/1; MG/1; MG/1; MG/1
20 CAPSULE, EXTENDED RELEASE ORAL EVERY MORNING
Qty: 30 CAPSULE | Refills: 0 | Status: SHIPPED | OUTPATIENT
Start: 2020-10-30 | End: 2020-11-30 | Stop reason: SDUPTHER

## 2020-11-04 ENCOUNTER — OFFICE VISIT (OUTPATIENT)
Dept: FAMILY MEDICINE CLINIC | Facility: CLINIC | Age: 20
End: 2020-11-04
Payer: COMMERCIAL

## 2020-11-04 VITALS
SYSTOLIC BLOOD PRESSURE: 128 MMHG | RESPIRATION RATE: 18 BRPM | HEART RATE: 90 BPM | WEIGHT: 151.4 LBS | HEIGHT: 73 IN | OXYGEN SATURATION: 98 % | DIASTOLIC BLOOD PRESSURE: 62 MMHG | BODY MASS INDEX: 20.06 KG/M2 | TEMPERATURE: 98.1 F

## 2020-11-04 DIAGNOSIS — F43.21 GRIEF: ICD-10-CM

## 2020-11-04 DIAGNOSIS — F90.9 ATTENTION DEFICIT HYPERACTIVITY DISORDER (ADHD), UNSPECIFIED ADHD TYPE: Primary | ICD-10-CM

## 2020-11-04 DIAGNOSIS — F41.9 ANXIETY: ICD-10-CM

## 2020-11-04 PROCEDURE — 99214 OFFICE O/P EST MOD 30 MIN: CPT | Performed by: FAMILY MEDICINE

## 2020-11-04 PROCEDURE — 3008F BODY MASS INDEX DOCD: CPT | Performed by: FAMILY MEDICINE

## 2020-11-04 PROCEDURE — 3725F SCREEN DEPRESSION PERFORMED: CPT | Performed by: FAMILY MEDICINE

## 2020-11-06 ENCOUNTER — TELEPHONE (OUTPATIENT)
Dept: PSYCHIATRY | Facility: CLINIC | Age: 20
End: 2020-11-06

## 2020-11-20 ENCOUNTER — TELEMEDICINE (OUTPATIENT)
Dept: FAMILY MEDICINE CLINIC | Facility: CLINIC | Age: 20
End: 2020-11-20
Payer: COMMERCIAL

## 2020-11-20 DIAGNOSIS — Z20.822 ENCOUNTER FOR SCREENING LABORATORY TESTING FOR COVID-19 VIRUS IN ASYMPTOMATIC PATIENT: Primary | ICD-10-CM

## 2020-11-20 PROCEDURE — 1036F TOBACCO NON-USER: CPT | Performed by: FAMILY MEDICINE

## 2020-11-20 PROCEDURE — 99212 OFFICE O/P EST SF 10 MIN: CPT | Performed by: FAMILY MEDICINE

## 2020-11-30 DIAGNOSIS — F90.9 ATTENTION DEFICIT HYPERACTIVITY DISORDER (ADHD), UNSPECIFIED ADHD TYPE: ICD-10-CM

## 2020-11-30 RX ORDER — DEXTROAMPHETAMINE SACCHARATE, AMPHETAMINE ASPARTATE MONOHYDRATE, DEXTROAMPHETAMINE SULFATE AND AMPHETAMINE SULFATE 5; 5; 5; 5 MG/1; MG/1; MG/1; MG/1
20 CAPSULE, EXTENDED RELEASE ORAL EVERY MORNING
Qty: 30 CAPSULE | Refills: 0 | Status: SHIPPED | OUTPATIENT
Start: 2020-11-30 | End: 2020-12-30 | Stop reason: SDUPTHER

## 2020-12-30 DIAGNOSIS — F90.9 ATTENTION DEFICIT HYPERACTIVITY DISORDER (ADHD), UNSPECIFIED ADHD TYPE: ICD-10-CM

## 2020-12-30 RX ORDER — DEXTROAMPHETAMINE SACCHARATE, AMPHETAMINE ASPARTATE MONOHYDRATE, DEXTROAMPHETAMINE SULFATE AND AMPHETAMINE SULFATE 5; 5; 5; 5 MG/1; MG/1; MG/1; MG/1
20 CAPSULE, EXTENDED RELEASE ORAL EVERY MORNING
Qty: 30 CAPSULE | Refills: 0 | Status: SHIPPED | OUTPATIENT
Start: 2020-12-30 | End: 2021-02-10 | Stop reason: SDUPTHER

## 2021-02-10 DIAGNOSIS — F90.9 ATTENTION DEFICIT HYPERACTIVITY DISORDER (ADHD), UNSPECIFIED ADHD TYPE: ICD-10-CM

## 2021-02-10 RX ORDER — DEXTROAMPHETAMINE SACCHARATE, AMPHETAMINE ASPARTATE MONOHYDRATE, DEXTROAMPHETAMINE SULFATE AND AMPHETAMINE SULFATE 5; 5; 5; 5 MG/1; MG/1; MG/1; MG/1
20 CAPSULE, EXTENDED RELEASE ORAL EVERY MORNING
Qty: 30 CAPSULE | Refills: 0 | Status: SHIPPED | OUTPATIENT
Start: 2021-02-10 | End: 2021-03-19 | Stop reason: SDUPTHER

## 2021-02-25 ENCOUNTER — OFFICE VISIT (OUTPATIENT)
Dept: FAMILY MEDICINE CLINIC | Facility: CLINIC | Age: 21
End: 2021-02-25
Payer: COMMERCIAL

## 2021-02-25 VITALS
OXYGEN SATURATION: 98 % | RESPIRATION RATE: 16 BRPM | WEIGHT: 143.6 LBS | HEIGHT: 73 IN | DIASTOLIC BLOOD PRESSURE: 70 MMHG | BODY MASS INDEX: 19.03 KG/M2 | SYSTOLIC BLOOD PRESSURE: 118 MMHG | TEMPERATURE: 97.7 F | HEART RATE: 97 BPM

## 2021-02-25 DIAGNOSIS — L73.9 FOLLICULITIS: ICD-10-CM

## 2021-02-25 DIAGNOSIS — R19.09 LUMP IN THE GROIN: Primary | ICD-10-CM

## 2021-02-25 PROCEDURE — 99213 OFFICE O/P EST LOW 20 MIN: CPT | Performed by: FAMILY MEDICINE

## 2021-02-25 RX ORDER — CEPHALEXIN 500 MG/1
500 CAPSULE ORAL EVERY 12 HOURS SCHEDULED
Qty: 20 CAPSULE | Refills: 0 | Status: SHIPPED | OUTPATIENT
Start: 2021-02-25 | End: 2021-03-07

## 2021-02-25 NOTE — PATIENT INSTRUCTIONS
Here for likely folliculitis s/p shaving  There is a 1/2 marble size lump in left upper scrotal area  Start abx and keep area clean with soap and water  Consult Urology for this lump  When shaving rec clean razors and call if any problems

## 2021-02-25 NOTE — PROGRESS NOTES
Assessment/Plan:  Chief Complaint   Patient presents with    Mass     pt is here w/ c/o lump in left groin area times 1 week      Patient Instructions   Here for likely folliculitis s/p shaving  There is a 1/2 marble size lump in left upper scrotal area  Start abx and keep area clean with soap and water  Consult Urology for this lump  When shaving rec clean razors and call if any problems  No problem-specific Assessment & Plan notes found for this encounter  Diagnoses and all orders for this visit:    Lump in the groin  -     cephalexin (KEFLEX) 500 mg capsule; Take 1 capsule (500 mg total) by mouth every 12 (twelve) hours for 10 days  -     Ambulatory referral to Urology; Future    Folliculitis          Subjective:      Patient ID: Poncho Gold is a 24 y o  male  Mass (pt is here w/ c/o lump in left groin area times 1 week ) Tender to touch when squeezing the lump  No discharge  The following portions of the patient's history were reviewed and updated as appropriate: allergies, current medications, past family history, past medical history, past social history, past surgical history and problem list     Review of Systems   Constitutional: Negative  HENT: Negative  Eyes: Negative  Respiratory: Negative  Cardiovascular: Negative  Gastrointestinal: Negative  Endocrine: Negative  Genitourinary:        Lump left groin area near scrotum s/p shaving   Musculoskeletal: Negative  Skin: Negative  Allergic/Immunologic: Negative  Neurological: Negative  Hematological: Negative  Psychiatric/Behavioral: Negative  Objective:      /70   Pulse 97   Temp 97 7 °F (36 5 °C) (Temporal)   Resp 16   Ht 6' 1" (1 854 m)   Wt 65 1 kg (143 lb 9 6 oz)   SpO2 98%   BMI 18 95 kg/m²          Physical Exam  Constitutional:       Appearance: He is well-developed  HENT:      Head: Normocephalic and atraumatic        Right Ear: External ear normal       Left Ear: External ear normal       Nose: Nose normal    Eyes:      Conjunctiva/sclera: Conjunctivae normal       Pupils: Pupils are equal, round, and reactive to light  Neck:      Musculoskeletal: Normal range of motion and neck supple  Cardiovascular:      Rate and Rhythm: Normal rate and regular rhythm  Heart sounds: Normal heart sounds  Pulmonary:      Effort: Pulmonary effort is normal       Breath sounds: Normal breath sounds  Musculoskeletal: Normal range of motion  Skin:     General: Skin is warm and dry  Comments: Left groin lump in left upper scrotal area   Neurological:      Mental Status: He is alert and oriented to person, place, and time  Deep Tendon Reflexes: Reflexes are normal and symmetric     Psychiatric:         Behavior: Behavior normal

## 2021-03-08 ENCOUNTER — OFFICE VISIT (OUTPATIENT)
Dept: FAMILY MEDICINE CLINIC | Facility: CLINIC | Age: 21
End: 2021-03-08
Payer: COMMERCIAL

## 2021-03-08 VITALS
HEIGHT: 73 IN | TEMPERATURE: 96.3 F | WEIGHT: 147.6 LBS | DIASTOLIC BLOOD PRESSURE: 68 MMHG | OXYGEN SATURATION: 97 % | SYSTOLIC BLOOD PRESSURE: 124 MMHG | RESPIRATION RATE: 16 BRPM | BODY MASS INDEX: 19.56 KG/M2 | HEART RATE: 71 BPM

## 2021-03-08 DIAGNOSIS — L73.9 FOLLICULITIS: ICD-10-CM

## 2021-03-08 DIAGNOSIS — R19.09 LUMP IN THE GROIN: Primary | ICD-10-CM

## 2021-03-08 PROCEDURE — 1036F TOBACCO NON-USER: CPT | Performed by: FAMILY MEDICINE

## 2021-03-08 PROCEDURE — 99213 OFFICE O/P EST LOW 20 MIN: CPT | Performed by: FAMILY MEDICINE

## 2021-03-08 PROCEDURE — 3008F BODY MASS INDEX DOCD: CPT | Performed by: FAMILY MEDICINE

## 2021-03-08 NOTE — PATIENT INSTRUCTIONS
Resolved folliculitis and never picked up abx  Call if any problems and also rec keeping area clean and dry, make sure razors for groin are clean and use blades   Schedule general PE

## 2021-03-08 NOTE — PROGRESS NOTES
Assessment/Plan:  Chief Complaint   Patient presents with    Mass     Groin  Patient Instructions   Resolved folliculitis and never picked up abx  Call if any problems and also rec keeping area clean and dry, make sure razors for groin are clean and use blades  Schedule general PE  No problem-specific Assessment & Plan notes found for this encounter  Diagnoses and all orders for this visit:    Lump in the groin    Folliculitis          Subjective:      Patient ID: Long Coad is a 24 y o  male  Lump in left scrotal area wnl, no pain or lump in scrotal area, resolved, did not take abx as directed and resolved by itself  He does shave in the area  The following portions of the patient's history were reviewed and updated as appropriate: allergies, current medications, past family history, past medical history, past social history, past surgical history and problem list     Review of Systems   Constitutional: Negative  Negative for fever  HENT: Negative  Eyes: Negative  Respiratory: Negative  Cardiovascular: Negative  Gastrointestinal: Negative  Endocrine: Negative  Genitourinary: Negative  No scrotal or groin lump seen, resolved since last office visit  Musculoskeletal: Negative  Skin: Negative  Allergic/Immunologic: Negative  Neurological: Negative  Hematological: Negative  Psychiatric/Behavioral: Negative  Objective:      /68 (BP Location: Left arm, Patient Position: Sitting, Cuff Size: Adult)   Pulse 71   Temp (!) 96 3 °F (35 7 °C) (Temporal)   Resp 16   Ht 6' 1" (1 854 m)   Wt 67 kg (147 lb 9 6 oz)   SpO2 97%   BMI 19 47 kg/m²          Physical Exam  Constitutional:       Appearance: He is well-developed  HENT:      Head: Normocephalic and atraumatic  Eyes:      Conjunctiva/sclera: Conjunctivae normal       Pupils: Pupils are equal, round, and reactive to light     Neck:      Musculoskeletal: Normal range of motion and neck supple  Cardiovascular:      Rate and Rhythm: Normal rate and regular rhythm  Heart sounds: Normal heart sounds  Pulmonary:      Effort: Pulmonary effort is normal       Breath sounds: Normal breath sounds  Musculoskeletal: Normal range of motion  Skin:     General: Skin is warm and dry  Findings: No rash  Comments: Resolved left scrotal lump in left groin area, likely follicular cyst     Neurological:      Mental Status: He is alert and oriented to person, place, and time  Deep Tendon Reflexes: Reflexes are normal and symmetric     Psychiatric:         Behavior: Behavior normal

## 2021-03-19 DIAGNOSIS — F90.9 ATTENTION DEFICIT HYPERACTIVITY DISORDER (ADHD), UNSPECIFIED ADHD TYPE: ICD-10-CM

## 2021-03-19 RX ORDER — DEXTROAMPHETAMINE SACCHARATE, AMPHETAMINE ASPARTATE MONOHYDRATE, DEXTROAMPHETAMINE SULFATE AND AMPHETAMINE SULFATE 5; 5; 5; 5 MG/1; MG/1; MG/1; MG/1
20 CAPSULE, EXTENDED RELEASE ORAL EVERY MORNING
Qty: 30 CAPSULE | Refills: 0 | Status: SHIPPED | OUTPATIENT
Start: 2021-03-19 | End: 2021-04-23 | Stop reason: SDUPTHER

## 2021-04-23 ENCOUNTER — OFFICE VISIT (OUTPATIENT)
Dept: FAMILY MEDICINE CLINIC | Facility: CLINIC | Age: 21
End: 2021-04-23
Payer: COMMERCIAL

## 2021-04-23 VITALS
HEART RATE: 92 BPM | RESPIRATION RATE: 17 BRPM | TEMPERATURE: 97.8 F | HEIGHT: 73 IN | DIASTOLIC BLOOD PRESSURE: 68 MMHG | OXYGEN SATURATION: 98 % | WEIGHT: 144 LBS | BODY MASS INDEX: 19.09 KG/M2 | SYSTOLIC BLOOD PRESSURE: 124 MMHG

## 2021-04-23 DIAGNOSIS — B35.1 ONYCHOMYCOSIS OF LEFT GREAT TOE: ICD-10-CM

## 2021-04-23 DIAGNOSIS — Z13.220 SCREENING FOR HYPERLIPIDEMIA: ICD-10-CM

## 2021-04-23 DIAGNOSIS — S76.212A INGUINAL STRAIN, LEFT, INITIAL ENCOUNTER: ICD-10-CM

## 2021-04-23 DIAGNOSIS — R59.9 LYMPH NODE ENLARGEMENT: ICD-10-CM

## 2021-04-23 DIAGNOSIS — F90.9 ATTENTION DEFICIT HYPERACTIVITY DISORDER (ADHD), UNSPECIFIED ADHD TYPE: ICD-10-CM

## 2021-04-23 DIAGNOSIS — Z00.00 HEALTH CARE MAINTENANCE: Primary | ICD-10-CM

## 2021-04-23 DIAGNOSIS — F41.9 ANXIETY: ICD-10-CM

## 2021-04-23 PROCEDURE — 99395 PREV VISIT EST AGE 18-39: CPT | Performed by: FAMILY MEDICINE

## 2021-04-23 PROCEDURE — 1036F TOBACCO NON-USER: CPT | Performed by: FAMILY MEDICINE

## 2021-04-23 PROCEDURE — 3008F BODY MASS INDEX DOCD: CPT | Performed by: FAMILY MEDICINE

## 2021-04-23 RX ORDER — DEXTROAMPHETAMINE SACCHARATE, AMPHETAMINE ASPARTATE MONOHYDRATE, DEXTROAMPHETAMINE SULFATE AND AMPHETAMINE SULFATE 5; 5; 5; 5 MG/1; MG/1; MG/1; MG/1
20 CAPSULE, EXTENDED RELEASE ORAL EVERY MORNING
Qty: 30 CAPSULE | Refills: 0 | Status: SHIPPED | OUTPATIENT
Start: 2021-04-23 | End: 2021-05-27 | Stop reason: SDUPTHER

## 2021-04-23 NOTE — PROGRESS NOTES
Assessment/Plan:  Chief Complaint   Patient presents with    Physical Exam     Annual     Patient Instructions   Here for general PE, rec ortho consult for left inguinal pain and strain and also podiatry for left great toenail onychomycosis and abnormal nail presentation  Rest and take meds for ADHD as directed and f-up with [facility] for anxiety  He does use medical marijuana for anxiety  Rec US left groin for left inguinal pain and mildly enlarged left groin lymph node  Call if any problems  Recheck in 6 months for ADHD  No problem-specific Assessment & Plan notes found for this encounter  Diagnoses and all orders for this visit:    Health care maintenance  -     Comprehensive metabolic panel; Future  -     CBC and differential; Future  -     Lipid Panel with Direct LDL reflex; Future    Attention deficit hyperactivity disorder (ADHD), unspecified ADHD type  -     amphetamine-dextroamphetamine (ADDERALL XR) 20 MG 24 hr capsule; Take 1 capsule (20 mg total) by mouth every morningMax Daily Amount: 20 mg  -     Comprehensive metabolic panel; Future  -     CBC and differential; Future    Anxiety  -     Comprehensive metabolic panel; Future  -     CBC and differential; Future    Inguinal strain, left, initial encounter  -     Ambulatory referral to Orthopedic Surgery; Future  -     US groin/inguinal area; Future    Onychomycosis of left great toe  -     Ambulatory referral to Podiatry; Future    Screening for hyperlipidemia  -     Comprehensive metabolic panel; Future  -     Lipid Panel with Direct LDL reflex; Future    Lymph node enlargement  -     Comprehensive metabolic panel; Future  -     CBC and differential; Future  -     US groin/inguinal area; Future          Subjective:      Patient ID: Jaja Pérez is a 24 y o  male  Physical Exam (Annual) Patient lives at home but looking to move  He works by making concrete molds  No cp or sob, eats healthy and exercise every other day   Patient use medical marijuana from [facility] for anxiety  His left great toenail has curved nails and nail was torn off twice in past  It now has a curved toenail great toe that is colored and bent in abnormal direction  The following portions of the patient's history were reviewed and updated as appropriate: allergies, current medications, past family history, past medical history, past social history, past surgical history and problem list     Review of Systems   Constitutional: Negative  HENT: Negative  Eyes: Negative  Respiratory: Negative  Cardiovascular: Negative  Gastrointestinal: Negative  Endocrine: Negative  Genitourinary: Negative  Musculoskeletal: Negative  Skin:        Left great toenail trauma and onychomycoses   Allergic/Immunologic: Negative  Neurological: Negative  Hematological: Negative  Psychiatric/Behavioral:        Anxiety - sees [facility] once monthly, adhd stable         Objective:      /68 (BP Location: Left arm, Patient Position: Sitting, Cuff Size: Large)   Pulse 92   Temp 97 8 °F (36 6 °C) (Temporal)   Resp 17   Ht 6' 1" (1 854 m)   Wt 65 3 kg (144 lb)   SpO2 98%   BMI 19 00 kg/m²          Physical Exam  Constitutional:       Appearance: He is well-developed  HENT:      Head: Normocephalic and atraumatic  Right Ear: External ear normal       Left Ear: External ear normal       Nose: Nose normal    Eyes:      Conjunctiva/sclera: Conjunctivae normal       Pupils: Pupils are equal, round, and reactive to light  Neck:      Musculoskeletal: Normal range of motion and neck supple  Cardiovascular:      Rate and Rhythm: Normal rate and regular rhythm  Heart sounds: Normal heart sounds  Pulmonary:      Effort: Pulmonary effort is normal       Breath sounds: Normal breath sounds  Abdominal:      General: Abdomen is flat  Bowel sounds are normal       Palpations: Abdomen is soft     Genitourinary:     Penis: Normal        Scrotum/Testes: Normal  Musculoskeletal: Normal range of motion  Skin:     General: Skin is warm and dry  Comments: Left great toe onychomycosis   Neurological:      Mental Status: He is alert and oriented to person, place, and time  Deep Tendon Reflexes: Reflexes are normal and symmetric     Psychiatric:         Behavior: Behavior normal       Comments: Anxiety and adhd stable

## 2021-04-23 NOTE — PATIENT INSTRUCTIONS
Here for general PE, rec ortho consult for left inguinal pain and strain and also podiatry for left great toenail onychomycosis and abnormal nail presentation  Rest and take meds for ADHD as directed and f-up with [facility] for anxiety  He does use medical marijuana for anxiety  Rec US left groin for left inguinal pain and mildly enlarged left groin lymph node  Call if any problems  Recheck in 6 months for ADHD

## 2021-04-29 ENCOUNTER — HOSPITAL ENCOUNTER (OUTPATIENT)
Dept: ULTRASOUND IMAGING | Facility: MEDICAL CENTER | Age: 21
Discharge: HOME | End: 2021-04-29
Payer: COMMERCIAL

## 2021-04-29 DIAGNOSIS — R59.9 LYMPH NODE ENLARGEMENT: ICD-10-CM

## 2021-04-29 DIAGNOSIS — S76.212A INGUINAL STRAIN, LEFT, INITIAL ENCOUNTER: ICD-10-CM

## 2021-04-29 PROCEDURE — 76705 ECHO EXAM OF ABDOMEN: CPT

## 2021-05-27 DIAGNOSIS — F90.9 ATTENTION DEFICIT HYPERACTIVITY DISORDER (ADHD), UNSPECIFIED ADHD TYPE: ICD-10-CM

## 2021-05-27 RX ORDER — DEXTROAMPHETAMINE SACCHARATE, AMPHETAMINE ASPARTATE MONOHYDRATE, DEXTROAMPHETAMINE SULFATE AND AMPHETAMINE SULFATE 5; 5; 5; 5 MG/1; MG/1; MG/1; MG/1
20 CAPSULE, EXTENDED RELEASE ORAL EVERY MORNING
Qty: 30 CAPSULE | Refills: 0 | Status: SHIPPED | OUTPATIENT
Start: 2021-05-27 | End: 2021-07-01 | Stop reason: SDUPTHER

## 2021-07-01 DIAGNOSIS — F90.9 ATTENTION DEFICIT HYPERACTIVITY DISORDER (ADHD), UNSPECIFIED ADHD TYPE: ICD-10-CM

## 2021-07-01 RX ORDER — DEXTROAMPHETAMINE SACCHARATE, AMPHETAMINE ASPARTATE MONOHYDRATE, DEXTROAMPHETAMINE SULFATE AND AMPHETAMINE SULFATE 5; 5; 5; 5 MG/1; MG/1; MG/1; MG/1
20 CAPSULE, EXTENDED RELEASE ORAL EVERY MORNING
Qty: 30 CAPSULE | Refills: 0 | Status: SHIPPED | OUTPATIENT
Start: 2021-07-01 | End: 2021-08-06 | Stop reason: SDUPTHER

## 2021-08-06 DIAGNOSIS — F90.9 ATTENTION DEFICIT HYPERACTIVITY DISORDER (ADHD), UNSPECIFIED ADHD TYPE: ICD-10-CM

## 2021-08-06 RX ORDER — DEXTROAMPHETAMINE SACCHARATE, AMPHETAMINE ASPARTATE MONOHYDRATE, DEXTROAMPHETAMINE SULFATE AND AMPHETAMINE SULFATE 5; 5; 5; 5 MG/1; MG/1; MG/1; MG/1
20 CAPSULE, EXTENDED RELEASE ORAL EVERY MORNING
Qty: 30 CAPSULE | Refills: 0 | Status: SHIPPED | OUTPATIENT
Start: 2021-08-06 | End: 2021-09-10 | Stop reason: SDUPTHER

## 2021-09-10 DIAGNOSIS — F90.9 ATTENTION DEFICIT HYPERACTIVITY DISORDER (ADHD), UNSPECIFIED ADHD TYPE: ICD-10-CM

## 2021-09-10 RX ORDER — DEXTROAMPHETAMINE SACCHARATE, AMPHETAMINE ASPARTATE MONOHYDRATE, DEXTROAMPHETAMINE SULFATE AND AMPHETAMINE SULFATE 5; 5; 5; 5 MG/1; MG/1; MG/1; MG/1
20 CAPSULE, EXTENDED RELEASE ORAL EVERY MORNING
Qty: 30 CAPSULE | Refills: 0 | Status: SHIPPED | OUTPATIENT
Start: 2021-09-10 | End: 2021-10-21 | Stop reason: SDUPTHER

## 2021-09-10 NOTE — TELEPHONE ENCOUNTER
Requested medication(s) are due for refill today: Yes  Patient has already received a courtesy refill: No  Other reason request has been forwarded to provider: Please advise Dr Mariah Carney is out today   Thank you

## 2021-09-21 ENCOUNTER — TELEMEDICINE (OUTPATIENT)
Dept: FAMILY MEDICINE CLINIC | Facility: CLINIC | Age: 21
End: 2021-09-21
Payer: COMMERCIAL

## 2021-09-21 DIAGNOSIS — B34.9 VIRAL INFECTION, UNSPECIFIED: Primary | ICD-10-CM

## 2021-09-21 PROCEDURE — 99213 OFFICE O/P EST LOW 20 MIN: CPT | Performed by: NURSE PRACTITIONER

## 2021-09-21 NOTE — PROGRESS NOTES
COVID-19 Outpatient Progress Note    Assessment/Plan:    Problem List Items Addressed This Visit     None      Visit Diagnoses     Viral infection, unspecified    -  Primary    Relevant Orders    Novel Coronavirus (Covid-19),PCR [facility] - Collected at [facility] or [facility]         Disposition:     I referred patient to one of our centralized sites for a COVID-19 swab  Advised patient to stay in isolation (not leaving home unless to seek urgent medical care) until results discussed with patient with further instruction  Discussed important of wearing mask around household members, avoiding contact with household members and cleaning surfaces frequently  Discussed important of monitoring temperature and symptoms  Call if any changes or worsening in symptoms, especially any changes with respiratory related symptoms  Please call the office if you are experiencing any worsening of symptoms or no symptom improvement  Stay well hydrated  Call for any changes in diarrhea symptoms  These are currently baseline for patient  No work until results are back  I have spent 15 minutes directly with the patient  Greater than 50% of this time was spent in counseling/coordination of care regarding: risks and benefits of treatment options, instructions for management, patient and family education and impressions  Verification of patient location:    Patient is located in the following state in which I hold an active license [place]    Encounter provider Jorge Alberto Paiz [place]    Provider located at [address] [place] [address] Nw  [facility] [[email or web address]] [address] [id number] [phone]    Recent Visits  No visits were found meeting these conditions  Showing recent visits within past 7 days and meeting all other requirements  Future Appointments  No visits were found meeting these conditions    Showing future appointments within next 150 days and meeting all other requirements     This virtual check-in was done via [employer] and patient was informed that this is a secure, HIPAA-compliant platform  He agrees to proceed  Patient agrees to participate in a virtual check in via telephone or video visit instead of presenting to the office to address urgent/immediate medical needs  Patient is aware this is a billable service  After connecting through [facility], the patient was identified by name and date of birth  Benny Mederos was informed that this was a telemedicine visit and that the exam was being conducted confidentially over secure lines  My office door was closed  No one else was in the room  Benny Mederos acknowledged consent and understanding of privacy and security of the telemedicine visit  I informed the patient that I have reviewed his record in Epic and presented the opportunity for him to ask any questions regarding the visit today  The patient agreed to participate  Subjective:   Benny Mederos is a 24 y o  male who is concerned about COVID-19  Patient's symptoms include anosmia, cough (to clear throat) and diarrhea  Patient denies fever, chills, fatigue, congestion, rhinorrhea, sore throat, loss of taste, shortness of breath, chest tightness, abdominal pain, nausea, vomiting, myalgias and headaches         Date of symptom onset: 9/17/2021  COVID-19 vaccination status: Partially vaccinated    Exposure:   Contact with a person who is under investigation (PUI) for or who is positive for COVID-19 within the last 14 days?: No    Hospitalized recently for fever and/or lower respiratory symptoms?: No      Currently a healthcare worker that is involved in direct patient care?: No      Works in a special setting where the risk of COVID-19 transmission may be high? (this may include long-term care, correctional and care home facilities; homeless shelters; assisted-living facilities and group homes ): No      Resident in a special setting where the risk of COVID-19 transmission may be high? (this may include long-term care, correctional and [facility] facilities; homeless shelters; assisted-living facilities and group homes ): No      He missed work yesterday due to diarrhea and phlegm in his throat  Not uncommon to get diarrhea at times  Appetite decreased due to diarrhea  Staying well hydrated  Had first vaccine for covid so far  Has likely hx of covid January 2020  No results found for: Jacqueline Little, [address] [facility], [place] [address]  Past Medical History:   Diagnosis Date    ADHD      No past surgical history on file  Current Outpatient Medications   Medication Sig Dispense Refill    amphetamine-dextroamphetamine (ADDERALL XR) 20 MG 24 hr capsule Take 1 capsule (20 mg total) by mouth every morningMax Daily Amount: 20 mg 30 capsule 0     No current facility-administered medications for this visit  No Known Allergies    Review of Systems   Constitutional: Negative for chills, fatigue and fever  HENT: Negative for congestion, rhinorrhea and sore throat  Eyes: Negative for discharge  Respiratory: Positive for cough (to clear throat)  Negative for chest tightness and shortness of breath  Cardiovascular: Negative for chest pain  Gastrointestinal: Positive for diarrhea  Negative for abdominal pain, constipation, nausea and vomiting  Genitourinary: Negative for difficulty urinating  Musculoskeletal: Negative for joint swelling and myalgias  Skin: Negative for rash  Neurological: Negative for headaches  Hematological: Negative for adenopathy  Psychiatric/Behavioral: The patient is not nervous/anxious  Objective: There were no vitals filed for this visit  Physical Exam  Constitutional:       General: He is not in acute distress  Appearance: Normal appearance  He is not ill-appearing, toxic-appearing or diaphoretic  HENT:      Head: Normocephalic and atraumatic  Eyes:      General: No scleral icterus    Pulmonary: Effort: Pulmonary effort is normal  No respiratory distress  Skin:     Coloration: Skin is not pale  Neurological:      Mental Status: He is alert and oriented to person, place, and time  Psychiatric:         Mood and Affect: Mood normal          VIRTUAL VISIT DISCLAIMER    [place]art[place] Sanford verbally agrees to participate in [employer]  Pt is aware that [employer] could be limited without vital signs or the ability to perform a full hands-on physical exam  Bienvenido Ndiaye understands he or the provider may request at any time to terminate the video visit and request the patient to seek care or treatment in person

## 2021-09-23 PROCEDURE — U0005 INFEC AGEN DETEC AMPLI PROBE: HCPCS | Performed by: NURSE PRACTITIONER

## 2021-09-23 PROCEDURE — U0003 INFECTIOUS AGENT DETECTION BY NUCLEIC ACID (DNA OR RNA); SEVERE ACUTE RESPIRATORY SYNDROME CORONAVIRUS 2 (SARS-COV-2) (CORONAVIRUS DISEASE [COVID-19]), AMPLIFIED PROBE TECHNIQUE, MAKING USE OF HIGH THROUGHPUT TECHNOLOGIES AS DESCRIBED BY CMS-2020-01-R: HCPCS | Performed by: NURSE PRACTITIONER

## 2021-10-21 DIAGNOSIS — F90.9 ATTENTION DEFICIT HYPERACTIVITY DISORDER (ADHD), UNSPECIFIED ADHD TYPE: ICD-10-CM

## 2021-10-21 RX ORDER — DEXTROAMPHETAMINE SACCHARATE, AMPHETAMINE ASPARTATE MONOHYDRATE, DEXTROAMPHETAMINE SULFATE AND AMPHETAMINE SULFATE 5; 5; 5; 5 MG/1; MG/1; MG/1; MG/1
20 CAPSULE, EXTENDED RELEASE ORAL EVERY MORNING
Qty: 30 CAPSULE | Refills: 0 | Status: SHIPPED | OUTPATIENT
Start: 2021-10-21 | End: 2021-11-24 | Stop reason: SDUPTHER

## 2021-11-04 ENCOUNTER — TELEMEDICINE (OUTPATIENT)
Dept: FAMILY MEDICINE CLINIC | Facility: CLINIC | Age: 21
End: 2021-11-04
Payer: COMMERCIAL

## 2021-11-04 DIAGNOSIS — R05.9 COUGH: Primary | ICD-10-CM

## 2021-11-04 DIAGNOSIS — Z20.822 ENCOUNTER FOR LABORATORY TESTING FOR COVID-19 VIRUS: ICD-10-CM

## 2021-11-04 DIAGNOSIS — R52 BODY ACHES: ICD-10-CM

## 2021-11-04 PROCEDURE — 99213 OFFICE O/P EST LOW 20 MIN: CPT | Performed by: FAMILY MEDICINE

## 2021-11-24 DIAGNOSIS — F90.9 ATTENTION DEFICIT HYPERACTIVITY DISORDER (ADHD), UNSPECIFIED ADHD TYPE: ICD-10-CM

## 2021-11-24 RX ORDER — DEXTROAMPHETAMINE SACCHARATE, AMPHETAMINE ASPARTATE MONOHYDRATE, DEXTROAMPHETAMINE SULFATE AND AMPHETAMINE SULFATE 5; 5; 5; 5 MG/1; MG/1; MG/1; MG/1
20 CAPSULE, EXTENDED RELEASE ORAL EVERY MORNING
Qty: 30 CAPSULE | Refills: 0 | Status: SHIPPED | OUTPATIENT
Start: 2021-11-24 | End: 2021-12-28 | Stop reason: SDUPTHER

## 2021-12-28 DIAGNOSIS — F90.9 ATTENTION DEFICIT HYPERACTIVITY DISORDER (ADHD), UNSPECIFIED ADHD TYPE: ICD-10-CM

## 2021-12-28 RX ORDER — DEXTROAMPHETAMINE SACCHARATE, AMPHETAMINE ASPARTATE MONOHYDRATE, DEXTROAMPHETAMINE SULFATE AND AMPHETAMINE SULFATE 5; 5; 5; 5 MG/1; MG/1; MG/1; MG/1
20 CAPSULE, EXTENDED RELEASE ORAL EVERY MORNING
Qty: 30 CAPSULE | Refills: 0 | Status: SHIPPED | OUTPATIENT
Start: 2021-12-28 | End: 2022-01-26 | Stop reason: SDUPTHER

## 2022-01-03 ENCOUNTER — TELEMEDICINE (OUTPATIENT)
Dept: FAMILY MEDICINE CLINIC | Facility: CLINIC | Age: 22
End: 2022-01-03
Payer: COMMERCIAL

## 2022-01-03 DIAGNOSIS — J06.9 ACUTE URI: Primary | ICD-10-CM

## 2022-01-03 PROCEDURE — 99213 OFFICE O/P EST LOW 20 MIN: CPT | Performed by: NURSE PRACTITIONER

## 2022-01-03 RX ORDER — FLUTICASONE PROPIONATE 50 MCG
2 SPRAY, SUSPENSION (ML) NASAL DAILY
Qty: 16 G | Refills: 0 | Status: SHIPPED | OUTPATIENT
Start: 2022-01-03

## 2022-01-03 RX ORDER — ALBUTEROL SULFATE 90 UG/1
2 AEROSOL, METERED RESPIRATORY (INHALATION) EVERY 6 HOURS PRN
Qty: 18 G | Refills: 0 | Status: SHIPPED | OUTPATIENT
Start: 2022-01-03

## 2022-01-03 NOTE — PROGRESS NOTES
Virtual Regular Visit    Verification of patient location:    Patient is located in the following state in which I hold an active license [place]      Assessment/Plan:    Problem List Items Addressed This Visit     None      Visit Diagnoses     Acute URI    -  Primary    Relevant Medications    fluticasone (FLONASE) 50 mcg/act nasal spray    albuterol (Ventolin HFA) 90 mcg/act inhaler        PCR covid tested not indicated at this time as it would not change plan of care  Start Flonase, this is an intranasal corticosteroid  This is 1-2 sprays each nostril once daily  Please be aware that this can cause nasal mucosa irritation  Stop using if you experience any nose bleeds  This can be used for allergy symptoms as well as ear discomfort/pressure  Use inhaler PRN for bronchospasm/wheezing  Stay well hydrated  Can continue with OTC cold medication  Please call the office if you are experiencing any worsening of symptoms or no symptom improvement  As long as symptom improvement and afebrile, can return to work Friday  He will call  Please call the office if you are experiencing any worsening of symptoms or no symptom improvement  Reason for visit is   Chief Complaint   Patient presents with    Virtual Regular Visit        Encounter provider Ashley Viera [facility]    Provider located at [address] [place] [address] [address] & [facility] [id number] [phone]      Recent Visits  No visits were found meeting these conditions  Showing recent visits within past 7 days and meeting all other requirements  Today's Visits  Date Type Provider Dept   01/03/22 Telemedicine Ashley Viera, [facility]   Showing today's visits and meeting all other requirements  Future Appointments  No visits were found meeting these conditions    Showing future appointments within next 150 days and meeting all other requirements       The patient was identified by name and date of birth  Lorin Hodgkins was informed that this is a telemedicine visit and that the visit is being conducted through Telephone  My office door was closed  No one else was in the room  He acknowledged consent and understanding of privacy and security of the video platform  The patient has agreed to participate and understands they can discontinue the visit at any time  Patient is aware this is a billable service  Subjective  Lorin Hodgkins is a 24 y o  male   Virtual for sick visit  Sent home from work Tuesday  Can't return until symptom free  He has cough, laryngitis, mucous production, completed mucous test which was negative  They wont let him return until symptoms resolve  Next scheduled day to work is Friday  He was tested for covid at [facility]  Has positive exposure to cousin on [place]  At home covid test done 12/29/21  He feels symptoms the same for past 3 days  Body aches  Has loss of taste starting last week  Symptom onset 12/26/21  He is vaccinated for COVID  It was my intent to perform this visit via video technology but the patient was not able to do a video connection so the visit was completed via audio telephone only  Past Medical History:   Diagnosis Date    ADHD        No past surgical history on file  Current Outpatient Medications   Medication Sig Dispense Refill    albuterol (Ventolin HFA) 90 mcg/act inhaler Inhale 2 puffs every 6 (six) hours as needed for wheezing or shortness of breath 18 g 0    amphetamine-dextroamphetamine (ADDERALL XR) 20 MG 24 hr capsule Take 1 capsule (20 mg total) by mouth every morning Max Daily Amount: 20 mg 30 capsule 0    fluticasone (FLONASE) 50 mcg/act nasal spray 2 sprays into each nostril daily 16 g 0     No current facility-administered medications for this visit  No Known Allergies    Review of Systems   Constitutional: Positive for chills and fatigue  Negative for fever     HENT: Positive for congestion, postnasal drip, sinus pressure, sinus pain and sore throat  Eyes: Negative for discharge  Respiratory: Positive for cough and wheezing  Negative for shortness of breath  Cardiovascular: Negative for chest pain  Gastrointestinal: Negative for constipation and diarrhea  Genitourinary: Negative for difficulty urinating  Musculoskeletal: Negative for joint swelling  Skin: Negative for rash  Neurological: Negative for headaches  Hematological: Negative for adenopathy  Psychiatric/Behavioral: The patient is not nervous/anxious  Video Exam    There were no vitals filed for this visit  Physical Exam     I spent 20 minutes with patient today in which greater than 50% of the time was spent in counseling/coordination of care regarding URI    VIRTUAL VISIT DISCLAIMER      Charles Rodríguez verbally agrees to participate in [employer]  Pt is aware that [employer] could be limited without vital signs or the ability to perform a full hands-on physical exam  Bienvenido Le understands he or the provider may request at any time to terminate the video visit and request the patient to seek care or treatment in person

## 2022-01-07 ENCOUNTER — TELEPHONE (OUTPATIENT)
Dept: FAMILY MEDICINE CLINIC | Facility: CLINIC | Age: 22
End: 2022-01-07

## 2022-01-07 DIAGNOSIS — B34.9 VIRAL ILLNESS: Primary | ICD-10-CM

## 2022-01-07 DIAGNOSIS — Z20.822 EXPOSURE TO COVID-19 VIRUS: ICD-10-CM

## 2022-01-07 NOTE — TELEPHONE ENCOUNTER
Requesting covid test, having acute URI symptoms x's 2 weeks, his parents just notified him they have tested positive for covid 19  He is not improving     covid test order placed  Test site info given

## 2022-01-26 DIAGNOSIS — F90.9 ATTENTION DEFICIT HYPERACTIVITY DISORDER (ADHD), UNSPECIFIED ADHD TYPE: ICD-10-CM

## 2022-01-26 RX ORDER — DEXTROAMPHETAMINE SACCHARATE, AMPHETAMINE ASPARTATE MONOHYDRATE, DEXTROAMPHETAMINE SULFATE AND AMPHETAMINE SULFATE 5; 5; 5; 5 MG/1; MG/1; MG/1; MG/1
20 CAPSULE, EXTENDED RELEASE ORAL EVERY MORNING
Qty: 30 CAPSULE | Refills: 0 | Status: SHIPPED | OUTPATIENT
Start: 2022-01-26 | End: 2022-03-07 | Stop reason: SDUPTHER

## 2022-03-07 DIAGNOSIS — F90.9 ATTENTION DEFICIT HYPERACTIVITY DISORDER (ADHD), UNSPECIFIED ADHD TYPE: ICD-10-CM

## 2022-03-07 RX ORDER — DEXTROAMPHETAMINE SACCHARATE, AMPHETAMINE ASPARTATE MONOHYDRATE, DEXTROAMPHETAMINE SULFATE AND AMPHETAMINE SULFATE 5; 5; 5; 5 MG/1; MG/1; MG/1; MG/1
20 CAPSULE, EXTENDED RELEASE ORAL EVERY MORNING
Qty: 30 CAPSULE | Refills: 0 | Status: SHIPPED | OUTPATIENT
Start: 2022-03-07 | End: 2022-04-27 | Stop reason: SDUPTHER

## 2022-04-27 DIAGNOSIS — F90.9 ATTENTION DEFICIT HYPERACTIVITY DISORDER (ADHD), UNSPECIFIED ADHD TYPE: ICD-10-CM

## 2022-04-27 RX ORDER — DEXTROAMPHETAMINE SACCHARATE, AMPHETAMINE ASPARTATE MONOHYDRATE, DEXTROAMPHETAMINE SULFATE AND AMPHETAMINE SULFATE 5; 5; 5; 5 MG/1; MG/1; MG/1; MG/1
20 CAPSULE, EXTENDED RELEASE ORAL EVERY MORNING
Qty: 30 CAPSULE | Refills: 0 | Status: SHIPPED | OUTPATIENT
Start: 2022-04-27 | End: 2022-06-28 | Stop reason: SDUPTHER

## 2022-06-28 DIAGNOSIS — F90.9 ATTENTION DEFICIT HYPERACTIVITY DISORDER (ADHD), UNSPECIFIED ADHD TYPE: ICD-10-CM

## 2022-06-28 RX ORDER — DEXTROAMPHETAMINE SACCHARATE, AMPHETAMINE ASPARTATE MONOHYDRATE, DEXTROAMPHETAMINE SULFATE AND AMPHETAMINE SULFATE 5; 5; 5; 5 MG/1; MG/1; MG/1; MG/1
20 CAPSULE, EXTENDED RELEASE ORAL EVERY MORNING
Qty: 30 CAPSULE | Refills: 0 | Status: SHIPPED | OUTPATIENT
Start: 2022-06-28 | End: 2022-06-30 | Stop reason: SDUPTHER

## 2022-06-30 ENCOUNTER — TELEPHONE (OUTPATIENT)
Dept: FAMILY MEDICINE CLINIC | Facility: CLINIC | Age: 22
End: 2022-06-30

## 2022-06-30 DIAGNOSIS — F90.9 ATTENTION DEFICIT HYPERACTIVITY DISORDER (ADHD), UNSPECIFIED ADHD TYPE: ICD-10-CM

## 2022-06-30 RX ORDER — DEXTROAMPHETAMINE SACCHARATE, AMPHETAMINE ASPARTATE MONOHYDRATE, DEXTROAMPHETAMINE SULFATE AND AMPHETAMINE SULFATE 5; 5; 5; 5 MG/1; MG/1; MG/1; MG/1
20 CAPSULE, EXTENDED RELEASE ORAL EVERY MORNING
Qty: 30 CAPSULE | Refills: 0 | Status: SHIPPED | OUTPATIENT
Start: 2022-06-30

## 2022-09-13 DIAGNOSIS — F90.9 ATTENTION DEFICIT HYPERACTIVITY DISORDER (ADHD), UNSPECIFIED ADHD TYPE: ICD-10-CM

## 2022-09-13 RX ORDER — DEXTROAMPHETAMINE SACCHARATE, AMPHETAMINE ASPARTATE MONOHYDRATE, DEXTROAMPHETAMINE SULFATE AND AMPHETAMINE SULFATE 5; 5; 5; 5 MG/1; MG/1; MG/1; MG/1
20 CAPSULE, EXTENDED RELEASE ORAL EVERY MORNING
Qty: 30 CAPSULE | Refills: 0 | Status: SHIPPED | OUTPATIENT
Start: 2022-09-13

## 2022-09-13 NOTE — TELEPHONE ENCOUNTER
I called and left the pt a detailed message making him aware, I advised him to please give the office a call back at his convenience to schedule an appt

## 2022-09-13 NOTE — TELEPHONE ENCOUNTER
Notify patient just refilled adhd med and is due for adhd check not seen since over 6 months in order to continue to prescribe med needs to be seen due to it neing a controlled substance

## 2022-09-15 NOTE — TELEPHONE ENCOUNTER
Left detailed voicemail message to make patient aware, asked for a return call to schedule an appointment

## 2023-04-07 ENCOUNTER — OFFICE VISIT (OUTPATIENT)
Dept: FAMILY MEDICINE CLINIC | Facility: CLINIC | Age: 23
End: 2023-04-07

## 2023-04-07 VITALS
TEMPERATURE: 98.4 F | HEIGHT: 73 IN | SYSTOLIC BLOOD PRESSURE: 100 MMHG | DIASTOLIC BLOOD PRESSURE: 70 MMHG | BODY MASS INDEX: 19.4 KG/M2 | WEIGHT: 146.38 LBS

## 2023-04-07 DIAGNOSIS — R05.1 ACUTE COUGH: Primary | ICD-10-CM

## 2023-04-07 DIAGNOSIS — R10.13 EPIGASTRIC PAIN: ICD-10-CM

## 2023-04-07 NOTE — LETTER
April 7, 2023     Patient: Keila Mcelroy  YOB: 2000  Date of Visit: 4/7/2023      To Whom it May Concern:    Keila Mcelroy is under my professional care  Leeann Galvez was seen in my office on 4/7/2023  He was out of work 4/4, 4/5, and 4/6 2023  Leeann Galvez may return to work on 4/11/2023  If you have any questions or concerns, please don't hesitate to call           Sincerely,          Tong Wing MD        CC: No Recipients

## 2023-04-07 NOTE — PROGRESS NOTES
Name: Alva Davenport      : 2000      MRN: [id number]  Encounter Provider: Rik Richmond MD  Encounter Date: 2023   Encounter department: [facility] PRIMARY CARE    Assessment & Plan     We will check COVID/flu  Patient seems well otherwise  Seems like abdominal pain is improving  Tolerating a diet  Abdominal pain may be due to recent retching  Continue diet as tolerated  RTC if worsening  We will contact patient with COVID flu results  1  Acute cough  -     Covid/Flu- Office Collect    2  Epigastric pain         Subjective      He presents today to discuss abdominal pain for the past few days  States a few days ago on Monday he had a fever, cough, nausea and vomiting  States that he was retching at that time  Continues to have some abdominal pain  Did not check himself for COVID  Is able to tolerate a diet  Denies any diarrhea  Review of Systems   Constitutional: Positive for fever  Negative for activity change, appetite change, chills and fatigue  HENT: Negative for congestion, rhinorrhea, sneezing and sore throat  Eyes: Negative for pain, discharge, redness and itching  Respiratory: Positive for cough  Negative for chest tightness, shortness of breath and wheezing  Cardiovascular: Negative for chest pain and palpitations  Gastrointestinal: Positive for abdominal pain, nausea and vomiting  Negative for constipation and diarrhea  Musculoskeletal: Negative for arthralgias, gait problem, myalgias and neck pain  Skin: Negative for rash  Neurological: Negative for dizziness, weakness, numbness and headaches  Psychiatric/Behavioral: Negative for suicidal ideas  All other systems reviewed and are negative        Current Outpatient Medications on File Prior to Visit   Medication Sig   • albuterol (Ventolin HFA) 90 mcg/act inhaler Inhale 2 puffs every 6 (six) hours as needed for wheezing or shortness of breath   • amphetamine-dextroamphetamine (ADDERALL XR) "20 MG 24 hr capsule Take 1 capsule (20 mg total) by mouth every morning Max Daily Amount: 20 mg   • fluticasone (FLONASE) 50 mcg/act nasal spray 2 sprays into each nostril daily (Patient not taking: Reported on 4/7/2023)       Objective     /70 (BP Location: Left arm, Patient Position: Sitting)   Temp 98 4 °F (36 9 °C)   Ht 6' 1\" (1 854 m)   Wt 66 4 kg (146 lb 6 oz)   BMI 19 31 kg/m²     Physical Exam  Vitals reviewed  Constitutional:       General: He is not in acute distress  Appearance: He is well-developed  He is not toxic-appearing or diaphoretic  HENT:      Head: Normocephalic and atraumatic  Right Ear: There is impacted cerumen  Left Ear: There is impacted cerumen  Nose: Nose normal       Mouth/Throat:      Pharynx: No oropharyngeal exudate  Eyes:      General: No scleral icterus  Right eye: No discharge  Left eye: No discharge  Conjunctiva/sclera: Conjunctivae normal    Cardiovascular:      Rate and Rhythm: Normal rate and regular rhythm  Heart sounds: Normal heart sounds  No murmur heard  Pulmonary:      Effort: Pulmonary effort is normal  No respiratory distress  Breath sounds: Normal breath sounds  No wheezing  Abdominal:      General: Bowel sounds are normal  There is no distension  Palpations: Abdomen is soft  Tenderness: There is abdominal tenderness in the epigastric area  Musculoskeletal:         General: No tenderness  Normal range of motion  Skin:     General: Skin is warm  Findings: No erythema or rash  Neurological:      Mental Status: He is alert     Psychiatric:         Mood and Affect: Mood normal          Behavior: Behavior normal        Joshua Doss MD  "

## 2023-04-08 LAB
FLUAV RNA RESP QL NAA+PROBE: NEGATIVE
FLUBV RNA RESP QL NAA+PROBE: NEGATIVE
SARS-COV-2 RNA RESP QL NAA+PROBE: POSITIVE

## 2023-07-18 ENCOUNTER — OFFICE VISIT (OUTPATIENT)
Dept: FAMILY MEDICINE CLINIC | Facility: CLINIC | Age: 23
End: 2023-07-18
Payer: COMMERCIAL

## 2023-07-18 VITALS
SYSTOLIC BLOOD PRESSURE: 120 MMHG | HEART RATE: 84 BPM | HEIGHT: 72 IN | OXYGEN SATURATION: 99 % | BODY MASS INDEX: 21.48 KG/M2 | WEIGHT: 158.6 LBS | TEMPERATURE: 97.5 F | DIASTOLIC BLOOD PRESSURE: 70 MMHG | RESPIRATION RATE: 16 BRPM

## 2023-07-18 DIAGNOSIS — R20.2 NUMBNESS AND TINGLING IN LEFT ARM: ICD-10-CM

## 2023-07-18 DIAGNOSIS — M25.512 ACUTE PAIN OF LEFT SHOULDER: ICD-10-CM

## 2023-07-18 DIAGNOSIS — R20.0 NUMBNESS AND TINGLING IN LEFT ARM: ICD-10-CM

## 2023-07-18 DIAGNOSIS — M79.602 LEFT ARM PAIN: Primary | ICD-10-CM

## 2023-07-18 DIAGNOSIS — F32.A DEPRESSION, UNSPECIFIED DEPRESSION TYPE: ICD-10-CM

## 2023-07-18 DIAGNOSIS — M54.2 CERVICAL PAIN (NECK): ICD-10-CM

## 2023-07-18 PROCEDURE — 99214 OFFICE O/P EST MOD 30 MIN: CPT | Performed by: FAMILY MEDICINE

## 2023-07-18 RX ORDER — PREDNISONE 10 MG/1
TABLET ORAL
Qty: 21 TABLET | Refills: 0 | Status: SHIPPED | OUTPATIENT
Start: 2023-07-18

## 2023-07-18 NOTE — PROGRESS NOTES
Name: Kulwinder Henderson      : 2000      MRN: [id number]  Encounter Provider: Capri Madison DO  Encounter Date: 2023   Encounter department: [address] [place] [address]  Chief Complaint   Patient presents with   • Arm Pain     Pt is here for left arm pain that travels to his shoulder. Pt states it aches and feels like his arm is on fire. Pt also has trouble closing his hand. Patient Instructions   Rec counseling for depression. Rec orthopedics for left shoulder and cervical pain with radiation to left arm and left hand along ulnar nerve distribution. Trial of prednisone 10 mg 6-5-4-3-2-1 with food. Call if worse. Rest left arm and shoulder. Assessment & Plan     1. Left arm pain  -     predniSONE 10 mg tablet; Take 60 mg po day#1, 50 mg po day#2, 40 mg po day#3, 30 mg po day#4, 20 mg po day#5m and 10 mg po day#6  -     Ambulatory Referral to Orthopedic Surgery; Future  -     XR shoulder 2+ vw left; Future; Expected date: 2023  -     XR spine cervical 2 or 3 vw injury; Future; Expected date: 2023    2. Numbness and tingling in left arm  -     Ambulatory Referral to Orthopedic Surgery; Future  -     XR shoulder 2+ vw left; Future; Expected date: 2023  -     XR spine cervical 2 or 3 vw injury; Future; Expected date: 2023    3. Acute pain of left shoulder  -     Ambulatory Referral to Orthopedic Surgery; Future  -     XR shoulder 2+ vw left; Future; Expected date: 2023  -     XR spine cervical 2 or 3 vw injury; Future; Expected date: 2023    4. Cervical pain (neck)  -     XR spine cervical 2 or 3 vw injury; Future; Expected date: 2023    5. Depression, unspecified depression type           Subjective      Arm Pain (Pt is here for left arm pain that travels to his left shoulder. Pt states it aches and feels like his arm is on fire. Pt also has trouble closing his hand.) Started last Monday. No fever or neck pain. Depression increased recently. He was on ADHD med in past and will see counseling first. Patient is interested in restarting adhd med again and will call if interested. Review of Systems   Constitutional: Negative. HENT: Negative. Eyes: Negative. Respiratory: Negative. Cardiovascular: Negative. Gastrointestinal: Negative. Endocrine: Negative. Genitourinary: Negative. Musculoskeletal:        Left arm pain along ulnar nerve   Skin: Negative. Allergic/Immunologic: Negative. Neurological: Negative. Hematological: Negative. Psychiatric/Behavioral: Negative. Depression         Current Outpatient Medications on File Prior to Visit   Medication Sig   • amphetamine-dextroamphetamine (ADDERALL XR) 20 MG 24 hr capsule Take 1 capsule (20 mg total) by mouth every morning Max Daily Amount: 20 mg   • [DISCONTINUED] albuterol (Ventolin HFA) 90 mcg/act inhaler Inhale 2 puffs every 6 (six) hours as needed for wheezing or shortness of breath (Patient not taking: Reported on 7/18/2023)   • [DISCONTINUED] fluticasone (FLONASE) 50 mcg/act nasal spray 2 sprays into each nostril daily (Patient not taking: Reported on 4/7/2023)       Objective     /70   Pulse 84   Temp 97.5 °F (36.4 °C) (Temporal)   Resp 16   Ht 5' 11.65" (1.82 m)   Wt 71.9 kg (158 lb 9.6 oz)   SpO2 99%   BMI 21.72 kg/m²     Physical Exam  Constitutional:       Appearance: He is well-developed. HENT:      Head: Normocephalic and atraumatic. Eyes:      Conjunctiva/sclera: Conjunctivae normal.      Pupils: Pupils are equal, round, and reactive to light. Cardiovascular:      Rate and Rhythm: Normal rate and regular rhythm. Pulses: Normal pulses. Heart sounds: Normal heart sounds. Pulmonary:      Effort: Pulmonary effort is normal.      Breath sounds: Normal breath sounds. Musculoskeletal:      Cervical back: Normal range of motion and neck supple.       Comments: Left arm pain and tingling along ulnar nerve distribution with motion   Skin:     General: Skin is warm and dry. Capillary Refill: Capillary refill takes less than 2 seconds. Neurological:      General: No focal deficit present. Mental Status: He is alert and oriented to person, place, and time. Mental status is at baseline. Deep Tendon Reflexes: Reflexes are normal and symmetric. Psychiatric:         Mood and Affect: Mood normal.         Behavior: Behavior normal.         Thought Content:  Thought content normal.         Judgment: Judgment normal.      Comments: Depression recently, no thoughts of injuring oneslf or suicidal ideation       Geraldine Thakkar, DO

## 2023-07-18 NOTE — PATIENT INSTRUCTIONS
Rec counseling for depression. Rec orthopedics for left shoulder and cervical pain with radiation to left arm and left hand along ulnar nerve distribution. Trial of prednisone 10 mg 6-5-4-3-2-1 with food. Call if worse. Rest left arm and shoulder.

## 2023-07-31 DIAGNOSIS — F90.9 ATTENTION DEFICIT HYPERACTIVITY DISORDER (ADHD), UNSPECIFIED ADHD TYPE: ICD-10-CM

## 2023-08-01 RX ORDER — DEXTROAMPHETAMINE SACCHARATE, AMPHETAMINE ASPARTATE MONOHYDRATE, DEXTROAMPHETAMINE SULFATE AND AMPHETAMINE SULFATE 5; 5; 5; 5 MG/1; MG/1; MG/1; MG/1
20 CAPSULE, EXTENDED RELEASE ORAL EVERY MORNING
Qty: 30 CAPSULE | Refills: 0 | Status: SHIPPED | OUTPATIENT
Start: 2023-08-01

## 2023-09-13 NOTE — TELEPHONE ENCOUNTER
Dr Shivani Eddy pt; please authorize  PMED: 10/29/2018    Called pt and LM to see if he is taking this daily 
Patient takes it Monday through Friday  He does not take it on the weekends 
eddie

## 2023-09-27 DIAGNOSIS — F90.9 ATTENTION DEFICIT HYPERACTIVITY DISORDER (ADHD), UNSPECIFIED ADHD TYPE: ICD-10-CM

## 2023-09-27 RX ORDER — DEXTROAMPHETAMINE SACCHARATE, AMPHETAMINE ASPARTATE MONOHYDRATE, DEXTROAMPHETAMINE SULFATE AND AMPHETAMINE SULFATE 5; 5; 5; 5 MG/1; MG/1; MG/1; MG/1
20 CAPSULE, EXTENDED RELEASE ORAL EVERY MORNING
Qty: 30 CAPSULE | Refills: 0 | Status: SHIPPED | OUTPATIENT
Start: 2023-09-27

## 2023-10-30 DIAGNOSIS — F90.9 ATTENTION DEFICIT HYPERACTIVITY DISORDER (ADHD), UNSPECIFIED ADHD TYPE: ICD-10-CM

## 2023-10-30 RX ORDER — DEXTROAMPHETAMINE SACCHARATE, AMPHETAMINE ASPARTATE MONOHYDRATE, DEXTROAMPHETAMINE SULFATE AND AMPHETAMINE SULFATE 5; 5; 5; 5 MG/1; MG/1; MG/1; MG/1
20 CAPSULE, EXTENDED RELEASE ORAL EVERY MORNING
Qty: 30 CAPSULE | Refills: 0 | Status: SHIPPED | OUTPATIENT
Start: 2023-10-30

## 2023-12-26 DIAGNOSIS — F90.9 ATTENTION DEFICIT HYPERACTIVITY DISORDER (ADHD), UNSPECIFIED ADHD TYPE: ICD-10-CM

## 2023-12-26 RX ORDER — DEXTROAMPHETAMINE SACCHARATE, AMPHETAMINE ASPARTATE MONOHYDRATE, DEXTROAMPHETAMINE SULFATE AND AMPHETAMINE SULFATE 5; 5; 5; 5 MG/1; MG/1; MG/1; MG/1
20 CAPSULE, EXTENDED RELEASE ORAL EVERY MORNING
Qty: 30 CAPSULE | Refills: 0 | Status: SHIPPED | OUTPATIENT
Start: 2023-12-26

## 2024-01-23 ENCOUNTER — OFFICE VISIT (OUTPATIENT)
Dept: FAMILY MEDICINE CLINIC | Facility: CLINIC | Age: 24
End: 2024-01-23
Payer: COMMERCIAL

## 2024-01-23 VITALS
OXYGEN SATURATION: 99 % | DIASTOLIC BLOOD PRESSURE: 68 MMHG | HEIGHT: 72 IN | WEIGHT: 174 LBS | HEART RATE: 90 BPM | BODY MASS INDEX: 23.57 KG/M2 | SYSTOLIC BLOOD PRESSURE: 120 MMHG | TEMPERATURE: 97.6 F

## 2024-01-23 DIAGNOSIS — F90.9 ATTENTION DEFICIT HYPERACTIVITY DISORDER (ADHD), UNSPECIFIED ADHD TYPE: ICD-10-CM

## 2024-01-23 DIAGNOSIS — Z00.00 HEALTH CARE MAINTENANCE: Primary | ICD-10-CM

## 2024-01-23 DIAGNOSIS — Z13.220 SCREENING FOR HYPERLIPIDEMIA: ICD-10-CM

## 2024-01-23 PROCEDURE — 99395 PREV VISIT EST AGE 18-39: CPT | Performed by: FAMILY MEDICINE

## 2024-01-23 RX ORDER — DEXTROAMPHETAMINE SACCHARATE, AMPHETAMINE ASPARTATE MONOHYDRATE, DEXTROAMPHETAMINE SULFATE AND AMPHETAMINE SULFATE 5; 5; 5; 5 MG/1; MG/1; MG/1; MG/1
20 CAPSULE, EXTENDED RELEASE ORAL EVERY MORNING
Qty: 30 CAPSULE | Refills: 0 | Status: SHIPPED | OUTPATIENT
Start: 2024-01-23

## 2024-01-23 NOTE — PROGRESS NOTES
"Chief Complaint   Patient presents with    Physical Exam     Patient Instructions   Here for general PE and needs updated labs and rec eating healthy and exercising and rec also to call if any problems. Refilled adhd med and recheck in 6 months for ADHD. Use sunscreen and monitor for ticks.   Assessment/Plan:    No problem-specific Assessment & Plan notes found for this encounter.       Diagnoses and all orders for this visit:    Health care maintenance  -     Comprehensive metabolic panel; Future  -     CBC and differential; Future  -     Lipid Panel with Direct LDL reflex; Future    Attention deficit hyperactivity disorder (ADHD), unspecified ADHD type  -     amphetamine-dextroamphetamine (ADDERALL XR) 20 MG 24 hr capsule; Take 1 capsule (20 mg total) by mouth every morning Max Daily Amount: 20 mg    Screening for hyperlipidemia  -     Comprehensive metabolic panel; Future  -     Lipid Panel with Direct LDL reflex; Future          Subjective:      Patient ID: Bienvenido Mccann is a 24 y.o. male.    Physical Exam, single, and works at [employer] and is a . Trying to eat healthy and does exercise. Non smoker and rare alcohol use. ADHD stable on med.         The following portions of the patient's history were reviewed and updated as appropriate: allergies, current medications, past family history, past medical history, past social history, past surgical history, and problem list.    Review of Systems   Constitutional: Negative.    HENT: Negative.     Eyes: Negative.    Respiratory: Negative.     Cardiovascular: Negative.    Gastrointestinal: Negative.    Endocrine: Negative.    Genitourinary: Negative.    Musculoskeletal: Negative.    Skin: Negative.    Allergic/Immunologic: Negative.    Neurological: Negative.    Hematological: Negative.    Psychiatric/Behavioral: Negative.           Objective:      /68   Pulse 90   Temp 97.6 °F (36.4 °C) (Temporal)   Ht 5' 11.65\" (1.82 m)   Wt 78.9 kg (174 " lb)   SpO2 99%   BMI 23.83 kg/m²          Physical Exam  Constitutional:       Appearance: Normal appearance. He is well-developed.   HENT:      Head: Normocephalic and atraumatic.      Right Ear: Tympanic membrane, ear canal and external ear normal.      Left Ear: Tympanic membrane, ear canal and external ear normal.      Nose: Nose normal.      Mouth/Throat:      Mouth: Mucous membranes are moist.   Eyes:      Conjunctiva/sclera: Conjunctivae normal.      Pupils: Pupils are equal, round, and reactive to light.   Cardiovascular:      Rate and Rhythm: Normal rate and regular rhythm.      Pulses: Normal pulses.      Heart sounds: Normal heart sounds.   Pulmonary:      Effort: Pulmonary effort is normal.      Breath sounds: Normal breath sounds.   Abdominal:      General: Abdomen is flat. Bowel sounds are normal.      Palpations: Abdomen is soft.   Genitourinary:     Penis: Normal.       Testes: Normal.   Musculoskeletal:         General: Normal range of motion.      Cervical back: Normal range of motion and neck supple.   Skin:     General: Skin is warm and dry.      Capillary Refill: Capillary refill takes less than 2 seconds.   Neurological:      General: No focal deficit present.      Mental Status: He is alert and oriented to person, place, and time. Mental status is at baseline.      Deep Tendon Reflexes: Reflexes are normal and symmetric.   Psychiatric:         Mood and Affect: Mood normal.         Behavior: Behavior normal.         Thought Content: Thought content normal.         Judgment: Judgment normal.

## 2024-01-23 NOTE — PATIENT INSTRUCTIONS
Here for general PE and needs updated labs and rec eating healthy and exercising and rec also to call if any problems. Refilled adhd med and recheck in 6 months for ADHD. Use sunscreen and monitor for ticks.

## 2024-02-06 DIAGNOSIS — F90.9 ATTENTION DEFICIT HYPERACTIVITY DISORDER (ADHD), UNSPECIFIED ADHD TYPE: ICD-10-CM

## 2024-02-06 RX ORDER — DEXTROAMPHETAMINE SACCHARATE, AMPHETAMINE ASPARTATE MONOHYDRATE, DEXTROAMPHETAMINE SULFATE AND AMPHETAMINE SULFATE 5; 5; 5; 5 MG/1; MG/1; MG/1; MG/1
20 CAPSULE, EXTENDED RELEASE ORAL EVERY MORNING
Qty: 30 CAPSULE | Refills: 0 | Status: SHIPPED | OUTPATIENT
Start: 2024-02-06

## 2024-02-06 NOTE — TELEPHONE ENCOUNTER
Reason for call:   [x] Refill   [] Prior Auth  [] Other:     Office:   [x] PCP/Provider -   [] Specialty/Provider -     Medication: amphetamine-dextroamphetamine (ADDERALL XR) 20 MG 24 hr capsule    Quantity: #30    Pharmacy: [facility]/pharmacy #[id number] - [place] - [address] [facility] [facility] [phone]    Does the patient have enough for 3 days?   [x] Yes   [] No - Send as HP to POD

## 2024-02-27 ENCOUNTER — OFFICE VISIT (OUTPATIENT)
Dept: FAMILY MEDICINE CLINIC | Facility: CLINIC | Age: 24
End: 2024-02-27
Payer: COMMERCIAL

## 2024-02-27 ENCOUNTER — TELEPHONE (OUTPATIENT)
Dept: FAMILY MEDICINE CLINIC | Facility: CLINIC | Age: 24
End: 2024-02-27

## 2024-02-27 VITALS
OXYGEN SATURATION: 97 % | SYSTOLIC BLOOD PRESSURE: 120 MMHG | TEMPERATURE: 98 F | DIASTOLIC BLOOD PRESSURE: 74 MMHG | HEART RATE: 80 BPM | WEIGHT: 166.8 LBS | HEIGHT: 72 IN | BODY MASS INDEX: 22.59 KG/M2

## 2024-02-27 DIAGNOSIS — R05.9 COUGH, UNSPECIFIED TYPE: Primary | ICD-10-CM

## 2024-02-27 DIAGNOSIS — J02.9 SORE THROAT: ICD-10-CM

## 2024-02-27 PROCEDURE — 99213 OFFICE O/P EST LOW 20 MIN: CPT | Performed by: FAMILY MEDICINE

## 2024-02-27 RX ORDER — AZITHROMYCIN 250 MG/1
TABLET, FILM COATED ORAL DAILY
Qty: 6 TABLET | Refills: 0 | Status: SHIPPED | OUTPATIENT
Start: 2024-02-27 | End: 2024-03-03

## 2024-02-27 NOTE — PATIENT INSTRUCTIONS
Rest and fluids, Dimetapp DM cold and cough prn. Start abx and call if worse and stay well hydrated. Tylenol prn fever or pain.

## 2024-02-27 NOTE — PROGRESS NOTES
"Chief Complaint   Patient presents with    Cough     Cough,congestion,sore throat, started Sunday night, otc tylenol , mucinex      Patient Instructions   Rest and fluids, Dimetapp DM cold and cough prn. Start abx and call if worse and stay well hydrated. Tylenol prn fever or pain.   Assessment/Plan:    No problem-specific Assessment & Plan notes found for this encounter.       Diagnoses and all orders for this visit:    Cough, unspecified type  -     azithromycin (Zithromax) 250 mg tablet; Take 2 tablets (500 mg total) by mouth daily for 1 day, THEN 1 tablet (250 mg total) daily for 4 days.    Sore throat  -     azithromycin (Zithromax) 250 mg tablet; Take 2 tablets (500 mg total) by mouth daily for 1 day, THEN 1 tablet (250 mg total) daily for 4 days.          Subjective:      Patient ID: Bienvenido Mccann is a 24 y.o. male.    Cough (Cough,congestion,sore throat, started Sunday night, otc tylenol , mucinex ).     Cough  Associated symptoms include a sore throat.       The following portions of the patient's history were reviewed and updated as appropriate: allergies, current medications, past family history, past medical history, past social history, past surgical history, and problem list.    Review of Systems   Constitutional: Negative.    HENT:  Positive for sore throat.    Eyes: Negative.    Respiratory:  Positive for cough.    Cardiovascular: Negative.    Gastrointestinal: Negative.    Endocrine: Negative.    Genitourinary: Negative.    Musculoskeletal: Negative.    Skin: Negative.    Allergic/Immunologic: Negative.    Neurological: Negative.    Hematological: Negative.    Psychiatric/Behavioral: Negative.           Objective:      /74   Pulse 80   Temp 98 °F (36.7 °C) (Temporal)   Ht 5' 11.65\" (1.82 m)   Wt 75.7 kg (166 lb 12.8 oz)   SpO2 97%   BMI 22.84 kg/m²          Physical Exam  Constitutional:       Appearance: Normal appearance. He is well-developed.   HENT:      Head: Normocephalic and " atraumatic.      Right Ear: External ear normal.      Left Ear: External ear normal.      Nose: Nose normal.      Mouth/Throat:      Mouth: Mucous membranes are moist.      Comments: Post nasal drip  Eyes:      Conjunctiva/sclera: Conjunctivae normal.      Pupils: Pupils are equal, round, and reactive to light.   Cardiovascular:      Rate and Rhythm: Normal rate and regular rhythm.      Pulses: Normal pulses.      Heart sounds: Normal heart sounds.   Pulmonary:      Effort: Pulmonary effort is normal.      Breath sounds: Normal breath sounds.      Comments: Cough    Musculoskeletal:         General: Normal range of motion.      Cervical back: Normal range of motion and neck supple.   Skin:     General: Skin is warm and dry.      Capillary Refill: Capillary refill takes less than 2 seconds.   Neurological:      General: No focal deficit present.      Mental Status: He is alert and oriented to person, place, and time. Mental status is at baseline.      Deep Tendon Reflexes: Reflexes are normal and symmetric.   Psychiatric:         Mood and Affect: Mood normal.         Behavior: Behavior normal.         Thought Content: Thought content normal.         Judgment: Judgment normal.

## 2024-03-25 ENCOUNTER — OFFICE VISIT (OUTPATIENT)
Dept: FAMILY MEDICINE CLINIC | Facility: CLINIC | Age: 24
End: 2024-03-25
Payer: COMMERCIAL

## 2024-03-25 ENCOUNTER — TELEPHONE (OUTPATIENT)
Age: 24
End: 2024-03-25

## 2024-03-25 VITALS — OXYGEN SATURATION: 98 % | DIASTOLIC BLOOD PRESSURE: 70 MMHG | SYSTOLIC BLOOD PRESSURE: 110 MMHG | HEART RATE: 76 BPM

## 2024-03-25 DIAGNOSIS — F90.9 ATTENTION DEFICIT HYPERACTIVITY DISORDER (ADHD), UNSPECIFIED ADHD TYPE: ICD-10-CM

## 2024-03-25 DIAGNOSIS — R19.7 DIARRHEA, UNSPECIFIED TYPE: ICD-10-CM

## 2024-03-25 DIAGNOSIS — R52 BODY ACHES: ICD-10-CM

## 2024-03-25 DIAGNOSIS — J06.9 UPPER RESPIRATORY TRACT INFECTION, UNSPECIFIED TYPE: ICD-10-CM

## 2024-03-25 DIAGNOSIS — R05.1 ACUTE COUGH: Primary | ICD-10-CM

## 2024-03-25 LAB
SARS-COV-2 AG UPPER RESP QL IA: NEGATIVE
SL AMB POCT RAPID FLU A: NEGATIVE
SL AMB POCT RAPID FLU B: NEGATIVE
VALID CONTROL: NORMAL

## 2024-03-25 PROCEDURE — 87804 INFLUENZA ASSAY W/OPTIC: CPT | Performed by: FAMILY MEDICINE

## 2024-03-25 PROCEDURE — 87811 SARS-COV-2 COVID19 W/OPTIC: CPT | Performed by: FAMILY MEDICINE

## 2024-03-25 PROCEDURE — 99214 OFFICE O/P EST MOD 30 MIN: CPT | Performed by: FAMILY MEDICINE

## 2024-03-25 RX ORDER — AZITHROMYCIN 250 MG/1
TABLET, FILM COATED ORAL DAILY
Qty: 6 TABLET | Refills: 0 | Status: SHIPPED | OUTPATIENT
Start: 2024-03-25 | End: 2024-03-30

## 2024-03-25 RX ORDER — DEXTROAMPHETAMINE SACCHARATE, AMPHETAMINE ASPARTATE MONOHYDRATE, DEXTROAMPHETAMINE SULFATE AND AMPHETAMINE SULFATE 5; 5; 5; 5 MG/1; MG/1; MG/1; MG/1
20 CAPSULE, EXTENDED RELEASE ORAL EVERY MORNING
Qty: 30 CAPSULE | Refills: 0 | Status: SHIPPED | OUTPATIENT
Start: 2024-03-25

## 2024-03-25 NOTE — PROGRESS NOTES
Patient arrived to the office today to  a work note and upon arrival he made staff aware he was not feeling well. Patient was lowered to the floor by staff, he was pale in color physician Dr. Benavidez was alerted and came to assess the patient. Patient was brought to a room to lay down and put in the schedule to be seen.    DISPLAY PLAN FREE TEXT

## 2024-03-25 NOTE — PATIENT INSTRUCTIONS
Here for cold symptoms and diarrhea and feeling faint. Rec staying well hydrated and rec also BRAT diet and staying well hydrated. Call if worse or ER. Patient will be driven home by friend. Call with update in a couple days. Dimetapp DM cold and cough prn cough and pepto Bismol prn diarrhea.

## 2024-03-25 NOTE — TELEPHONE ENCOUNTER
Patient actually called back and asked to come pickup the letter, I did print It out and placed in the bin awaiting his pickup.

## 2024-03-25 NOTE — PROGRESS NOTES
Chief Complaint   Patient presents with    Cold Like Symptoms     Cold like symptoms x 2 days   Having episodes of diarrhea      Patient Instructions   Here for cold symptoms and diarrhea and feeling faint. Rec staying well hydrated and rec also BRAT diet and staying well hydrated. Call if worse or ER. Patient will be driven home by friend. Call with update in a couple days. Dimetapp DM cold and cough prn cough and pepto Bismol prn diarrhea.   Assessment/Plan:    No problem-specific Assessment & Plan notes found for this encounter.       Diagnoses and all orders for this visit:    Acute cough  -     POCT Rapid Covid Ag  -     POCT rapid flu A and B  -     azithromycin (Zithromax) 250 mg tablet; Take 2 tablets (500 mg total) by mouth daily for 1 day, THEN 1 tablet (250 mg total) daily for 4 days.    Body aches    Upper respiratory tract infection, unspecified type  -     azithromycin (Zithromax) 250 mg tablet; Take 2 tablets (500 mg total) by mouth daily for 1 day, THEN 1 tablet (250 mg total) daily for 4 days.    Diarrhea, unspecified type          Subjective:      Patient ID: Bienvenido Mccann is a 24 y.o. male.    Cold Like Symptoms (Cold like symptoms x 2 days  Having episodes of diarrhea )      No LOC and is here for evaluation. Here for girlfriend. Friend will be driving patient home. Patient on Mucinex and has been coughing up colored phlegm. Patient did not hit head.         The following portions of the patient's history were reviewed and updated as appropriate: allergies, current medications, past family history, past medical history, past social history, past surgical history, and problem list.    Review of Systems   Constitutional: Negative.    HENT: Negative.     Eyes: Negative.    Respiratory:  Positive for cough.    Cardiovascular: Negative.    Gastrointestinal: Negative.    Endocrine: Negative.    Genitourinary: Negative.    Musculoskeletal: Negative.    Skin: Negative.    Allergic/Immunologic: Negative.     Neurological: Negative.    Hematological: Negative.    Psychiatric/Behavioral: Negative.           Objective:      /70 (BP Location: Left arm, Patient Position: Lying right side, Cuff Size: Adult)   Pulse 76   SpO2 98%          Physical Exam  Constitutional:       Appearance: He is well-developed.   HENT:      Head: Normocephalic and atraumatic.      Right Ear: External ear normal.      Left Ear: External ear normal.      Nose: Nose normal.      Mouth/Throat:      Mouth: Mucous membranes are moist.   Eyes:      Conjunctiva/sclera: Conjunctivae normal.      Pupils: Pupils are equal, round, and reactive to light.   Cardiovascular:      Rate and Rhythm: Normal rate and regular rhythm.      Pulses: Normal pulses.      Heart sounds: Normal heart sounds.   Pulmonary:      Effort: Pulmonary effort is normal.      Breath sounds: Normal breath sounds.      Comments: cough  Abdominal:      General: Abdomen is flat. Bowel sounds are normal.      Palpations: Abdomen is soft.   Musculoskeletal:         General: Normal range of motion.      Cervical back: Normal range of motion and neck supple.   Skin:     General: Skin is warm and dry.      Capillary Refill: Capillary refill takes less than 2 seconds.   Neurological:      General: No focal deficit present.      Mental Status: He is alert and oriented to person, place, and time. Mental status is at baseline.      Deep Tendon Reflexes: Reflexes are normal and symmetric.   Psychiatric:         Mood and Affect: Mood normal.         Behavior: Behavior normal.         Thought Content: Thought content normal.         Judgment: Judgment normal.

## 2024-03-25 NOTE — TELEPHONE ENCOUNTER
Pt called asking if we could upload a copy of the work note given to him on 4/7/23 by Dr. Le into his [employer].  Stated he gave the note to his employer but did not keep a copy for himself.

## 2024-04-30 DIAGNOSIS — F90.9 ATTENTION DEFICIT HYPERACTIVITY DISORDER (ADHD), UNSPECIFIED ADHD TYPE: ICD-10-CM

## 2024-04-30 RX ORDER — DEXTROAMPHETAMINE SACCHARATE, AMPHETAMINE ASPARTATE MONOHYDRATE, DEXTROAMPHETAMINE SULFATE AND AMPHETAMINE SULFATE 5; 5; 5; 5 MG/1; MG/1; MG/1; MG/1
20 CAPSULE, EXTENDED RELEASE ORAL EVERY MORNING
Qty: 30 CAPSULE | Refills: 0 | Status: SHIPPED | OUTPATIENT
Start: 2024-04-30

## 2024-04-30 NOTE — TELEPHONE ENCOUNTER
Reason for call:   [x] Refill   [] Prior Auth  [] Other:     Office:   [x] PCP/Provider -   [] Specialty/Provider -     Medication:   amphetamine-dextroamphetamine (ADDERALL XR) 20 MG 24 hr capsule - Take 1 capsule (20 mg total) by mouth every morning Max Daily Amount: 20 mg     Pharmacy:   [facility]/pharmacy #[id number] - [place] [address]     Does the patient have enough for 3 days?   [] Yes   [x] No - Send as HP to POD

## 2024-06-03 DIAGNOSIS — F90.9 ATTENTION DEFICIT HYPERACTIVITY DISORDER (ADHD), UNSPECIFIED ADHD TYPE: ICD-10-CM

## 2024-06-04 RX ORDER — DEXTROAMPHETAMINE SACCHARATE, AMPHETAMINE ASPARTATE MONOHYDRATE, DEXTROAMPHETAMINE SULFATE AND AMPHETAMINE SULFATE 5; 5; 5; 5 MG/1; MG/1; MG/1; MG/1
20 CAPSULE, EXTENDED RELEASE ORAL EVERY MORNING
Qty: 30 CAPSULE | Refills: 0 | Status: SHIPPED | OUTPATIENT
Start: 2024-06-04

## 2024-06-24 ENCOUNTER — TELEPHONE (OUTPATIENT)
Age: 24
End: 2024-06-24

## 2024-06-24 NOTE — TELEPHONE ENCOUNTER
Patient called stating he has a psychiatrist appointment at 11:30 am today.  The psychiatrist requested the last office visit notes be faxed over (3/25/24 visit) to fax number [phone].  Please fax notes per patients request.

## 2024-07-23 ENCOUNTER — OFFICE VISIT (OUTPATIENT)
Dept: FAMILY MEDICINE CLINIC | Facility: CLINIC | Age: 24
End: 2024-07-23
Payer: COMMERCIAL

## 2024-07-23 VITALS
OXYGEN SATURATION: 99 % | TEMPERATURE: 98.3 F | WEIGHT: 155 LBS | BODY MASS INDEX: 20.99 KG/M2 | HEIGHT: 72 IN | HEART RATE: 60 BPM | DIASTOLIC BLOOD PRESSURE: 70 MMHG | SYSTOLIC BLOOD PRESSURE: 108 MMHG

## 2024-07-23 DIAGNOSIS — F90.9 ATTENTION DEFICIT HYPERACTIVITY DISORDER (ADHD), UNSPECIFIED ADHD TYPE: Primary | ICD-10-CM

## 2024-07-23 DIAGNOSIS — F41.9 ANXIETY: ICD-10-CM

## 2024-07-23 DIAGNOSIS — L98.9 SCALP LESION: ICD-10-CM

## 2024-07-23 DIAGNOSIS — F32.A DEPRESSION, UNSPECIFIED DEPRESSION TYPE: ICD-10-CM

## 2024-07-23 DIAGNOSIS — G47.00 INSOMNIA, UNSPECIFIED TYPE: ICD-10-CM

## 2024-07-23 PROCEDURE — 99214 OFFICE O/P EST MOD 30 MIN: CPT | Performed by: FAMILY MEDICINE

## 2024-07-23 RX ORDER — TRAZODONE HYDROCHLORIDE 50 MG/1
TABLET ORAL
COMMUNITY
Start: 2024-06-24

## 2024-07-23 RX ORDER — DULOXETIN HYDROCHLORIDE 30 MG/1
30 CAPSULE, DELAYED RELEASE ORAL DAILY
COMMUNITY
Start: 2024-06-24

## 2024-07-23 NOTE — PATIENT INSTRUCTIONS
Here for adhd and anxiety and depression also stable. Rec general surgery consult for irritated skin lesion on scalp. Eat healthy and exercise and rec seeing psychiatry as directed for anxiety and depression and sees psych once months. Patient only uses trazodone prn when needed for problems staying asleep.

## 2024-07-23 NOTE — PROGRESS NOTES
Ambulatory Visit  Name: Bienvenido Mccann      : 2000      MRN: [id number]  Encounter Provider: Chad Nam DO  Encounter Date: 2024   Encounter department: [facility] PRIMARY CARE  Chief Complaint   Patient presents with    Follow-up     recheck ADHD       Patient Instructions   Here for adhd and anxiety and depression also stable. Rec general surgery consult for irritated skin lesion on scalp. Eat healthy and exercise and rec seeing psychiatry as directed for anxiety and depression and sees psych once months. Patient only uses trazodone prn when needed for problems staying asleep.     Assessment & Plan   1. Attention deficit hyperactivity disorder (ADHD), unspecified ADHD type  Comments:  stable on med and no side effects  2. Anxiety  Comments:  stable on cymbalta and sees psychiatry  3. Depression, unspecified depression type  Comments:  stable on cymbalta  4. Scalp lesion  Comments:  consult general surgery  Orders:  -     Ambulatory Referral to General Surgery; Future  5. Insomnia, unspecified type  Comments:  trazodone helps, has a hard time staying asleep    [unfilled]  History of Present Illness     Here for adhd check and doing well and denies palpitations or insomnia or change in appetite and states it helps. Medication helps and uses it every 3rd day or so. Sees Psych and is on Duloxetine. This helps.         Review of Systems   Constitutional: Negative.    HENT: Negative.     Eyes: Negative.    Respiratory: Negative.     Cardiovascular: Negative.    Gastrointestinal: Negative.    Endocrine: Negative.    Genitourinary: Negative.    Musculoskeletal: Negative.    Skin:         Lesion on top of head, raised and irritated   Allergic/Immunologic: Negative.    Neurological: Negative.    Hematological: Negative.    Psychiatric/Behavioral: Negative.          Adhd stable, anxiety and depression stable on duloxetine given by psych.      Current Outpatient Medications on File Prior to  "Visit   Medication Sig Dispense Refill    amphetamine-dextroamphetamine (ADDERALL XR) 20 MG 24 hr capsule Take 1 capsule (20 mg total) by mouth every morning Max Daily Amount: 20 mg 30 capsule 0    DULoxetine (CYMBALTA) 30 mg delayed release capsule Take 30 mg by mouth daily      traZODone (DESYREL) 50 mg tablet TAKE 1/2 TO 1 TABLETS BY MOUTH AT BEDTIME AS NEEDED INSOMNIA       No current facility-administered medications on file prior to visit.      Objective     /70   Pulse 60   Temp 98.3 °F (36.8 °C) (Temporal)   Ht 5' 11.65\" (1.82 m)   Wt 70.3 kg (155 lb)   SpO2 99%   BMI 21.23 kg/m²     Physical Exam  Constitutional:       Appearance: Normal appearance. He is well-developed.   HENT:      Head: Normocephalic and atraumatic.      Right Ear: External ear normal.      Left Ear: External ear normal.      Nose: Nose normal.   Eyes:      Conjunctiva/sclera: Conjunctivae normal.      Pupils: Pupils are equal, round, and reactive to light.   Cardiovascular:      Rate and Rhythm: Normal rate and regular rhythm.      Pulses: Normal pulses.      Heart sounds: Normal heart sounds.   Pulmonary:      Effort: Pulmonary effort is normal.      Breath sounds: Normal breath sounds.   Musculoskeletal:         General: Normal range of motion.      Cervical back: Normal range of motion and neck supple.   Skin:     General: Skin is warm and dry.      Capillary Refill: Capillary refill takes less than 2 seconds.      Findings: Lesion (scalp lesion  on top of head) present.   Neurological:      General: No focal deficit present.      Mental Status: He is alert and oriented to person, place, and time. Mental status is at baseline.      Deep Tendon Reflexes: Reflexes are normal and symmetric.   Psychiatric:         Mood and Affect: Mood normal.         Behavior: Behavior normal.         Thought Content: Thought content normal.         Judgment: Judgment normal.      Comments: Adhd and anxiety and depression stable on meds.  "       Administrative Statements   I have spent a total time of 30 minutes in caring for this patient on the day of the visit/encounter including Diagnostic results, Prognosis, Risks and benefits of tx options, Instructions for management, Patient and family education, Importance of tx compliance, Risk factor reductions, Impressions, Counseling / Coordination of care, Documenting in the medical record, Reviewing / ordering tests, medicine, procedures  , and Obtaining or reviewing history  .

## 2024-10-02 ENCOUNTER — TELEPHONE (OUTPATIENT)
Dept: SURGERY | Facility: CLINIC | Age: 24
End: 2024-10-02

## 2024-11-05 ENCOUNTER — TELEPHONE (OUTPATIENT)
Dept: SURGERY | Facility: CLINIC | Age: 24
End: 2024-11-05

## 2024-11-05 NOTE — TELEPHONE ENCOUNTER
This pt had scheduled 3 Consults with Dr Kamlesh Quijano and canceled all 3 via the automated reminder system as follows:    8/27 Consult scheduled  8/26 Pt canceled appt     9/17 Consult rescheduled  9/16 Pt canceled appt    10/9 Consult rescheduled  10/8 Pt canceled appt    11/5 - Consult rescheduled  11/5 - Pt no showed    Based on the above, please do not schedule/reschedule with Dr Quijano.

## 2025-01-27 ENCOUNTER — OFFICE VISIT (OUTPATIENT)
Dept: FAMILY MEDICINE CLINIC | Facility: CLINIC | Age: 25
End: 2025-01-27
Payer: COMMERCIAL

## 2025-01-27 VITALS
WEIGHT: 154 LBS | SYSTOLIC BLOOD PRESSURE: 104 MMHG | RESPIRATION RATE: 16 BRPM | HEIGHT: 72 IN | HEART RATE: 73 BPM | OXYGEN SATURATION: 99 % | BODY MASS INDEX: 20.86 KG/M2 | DIASTOLIC BLOOD PRESSURE: 60 MMHG | TEMPERATURE: 98.7 F

## 2025-01-27 DIAGNOSIS — F41.9 ANXIETY AND DEPRESSION: ICD-10-CM

## 2025-01-27 DIAGNOSIS — Z00.00 HEALTH CARE MAINTENANCE: Primary | ICD-10-CM

## 2025-01-27 DIAGNOSIS — F32.A ANXIETY AND DEPRESSION: ICD-10-CM

## 2025-01-27 DIAGNOSIS — F90.9 ATTENTION DEFICIT HYPERACTIVITY DISORDER (ADHD), UNSPECIFIED ADHD TYPE: ICD-10-CM

## 2025-01-27 DIAGNOSIS — Z13.220 SCREENING FOR HYPERLIPIDEMIA: ICD-10-CM

## 2025-01-27 PROBLEM — F32.2 SEVERE MAJOR DEPRESSIVE DISORDER (HCC): Status: ACTIVE | Noted: 2025-01-27

## 2025-01-27 PROCEDURE — 99395 PREV VISIT EST AGE 18-39: CPT | Performed by: FAMILY MEDICINE

## 2025-01-27 RX ORDER — ESCITALOPRAM OXALATE 10 MG/1
1 TABLET ORAL DAILY
COMMUNITY
Start: 2025-01-13

## 2025-01-27 RX ORDER — HYDROXYZINE HYDROCHLORIDE 25 MG/1
TABLET, FILM COATED ORAL
COMMUNITY
Start: 2024-10-22

## 2025-01-27 NOTE — PROGRESS NOTES
Adult Annual Physical  Name: Bienvenido Mccann      : 2000      MRN: [id number]  Encounter Provider: Chad Nam DO  Encounter Date: 2025   Encounter department: [facility] PRIMARY CARE  Chief Complaint   Patient presents with    Well Check     Patient Instructions   Here for general PE and needs updated labs and also rec seeing eye doctor and dentist and rec also to call if any problems and see psychiatry as directed for anxiety and depression and adhd. Use sunscreen and monitor for ticks. Call if any problems.     Assessment & Plan  Health care maintenance    Orders:    Comprehensive metabolic panel; Future    CBC and differential; Future    Lipid Panel with Direct LDL reflex; Future    TSH, 3rd generation with Free T4 reflex; Future    Attention deficit hyperactivity disorder (ADHD), unspecified ADHD type    Orders:    Comprehensive metabolic panel; Future    CBC and differential; Future    Anxiety and depression  Depression Screening Follow-up Plan: Patient's depression screening was positive with a PHQ-2 score of 4. Their PHQ-9 score was 20. Patient with underlying depression and was advised to continue current medications as prescribed. Continue regular follow-up with their psychologist/therapist/psychiatrist who is managing their mental health condition(s).    Orders:    Comprehensive metabolic panel; Future    CBC and differential; Future    Screening for hyperlipidemia    Orders:    Comprehensive metabolic panel; Future    Lipid Panel with Direct LDL reflex; Future    TSH, 3rd generation with Free T4 reflex; Future    Immunizations and preventive care screenings were discussed with patient today. Appropriate education was printed on patient's after visit summary.    Counseling:  Alcohol/drug use: discussed moderation in alcohol intake, the recommendations for healthy alcohol use, and avoidance of illicit drug use.  Dental Health: discussed importance of regular tooth brushing,  flossing, and dental visits.  Injury prevention: discussed safety/seat belts, safety helmets, smoke detectors, carbon monoxide detectors, and smoking near bedding or upholstery.  Sexual health: discussed sexually transmitted diseases, partner selection, use of condoms, avoidance of unintended pregnancy, and contraceptive alternatives.  Exercise: the importance of regular exercise/physical activity was discussed. Recommend exercise 3-5 times per week for at least 30 minutes.       Depression Screening and Follow-up Plan: Patient's depression screening was positive with a PHQ-2 score of 4. Their PHQ-9 score was 20.   Continue regular follow-up with their mental health provider who is managing their mental health condition(s). Patient with underlying depression and was advised to continue current medications as prescribed.       History of Present Illness     Adult Annual Physical:  Patient presents for annual physical. 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 Smokers medical marijuana. Rare alcohol use. Sleeps 6-7 hours per night. Does exercise and tries to eat healthy as much as he can. Sees Psych at [facility] and sees them for anxiety and depression and ADHD. Takes all meds as directed. .     Diet and Physical Activity:  - Diet/Nutrition: well balanced diet.  - Exercise: moderate cardiovascular exercise.    Depression Screening:  - PHQ-2 Score: 4  - PHQ-9 Score: 20    General Health:  - Sleep: 4-6 hours of sleep on average.  - Hearing: normal hearing right ear and normal hearing left ear.  - Vision: wears contacts.  - Dental: no dental visits for > 1 year.     Health:  - History of STDs: no.   - Urinary symptoms: none.     Review of Systems   Constitutional: Negative.    HENT: Negative.     Eyes: Negative.    Respiratory: Negative.     Cardiovascular: Negative.    Gastrointestinal: Negative.    Endocrine: Negative.    Genitourinary: Negative.    Musculoskeletal:  "Negative.    Skin: Negative.    Allergic/Immunologic: Negative.    Neurological: Negative.    Hematological: Negative.    Psychiatric/Behavioral:          Anxiety and depression and adhd stable on meds and sees psychiatry     Current Outpatient Medications on File Prior to Visit   Medication Sig Dispense Refill    amphetamine-dextroamphetamine (ADDERALL XR) 20 MG 24 hr capsule Take 1 capsule (20 mg total) by mouth every morning Max Daily Amount: 20 mg 30 capsule 0    escitalopram (LEXAPRO) 10 mg tablet Take 1 tablet by mouth in the morning      hydrOXYzine HCL (ATARAX) 25 mg tablet TAKE 1-2 TABLETS BY MOUTH AT BEDTIME AS NEEDED FOR INSOMNIA      [DISCONTINUED] DULoxetine (CYMBALTA) 30 mg delayed release capsule Take 30 mg by mouth daily      [DISCONTINUED] traZODone (DESYREL) 50 mg tablet TAKE 1/2 TO 1 TABLETS BY MOUTH AT BEDTIME AS NEEDED INSOMNIA (Patient not taking: Reported on 1/27/2025)       No current facility-administered medications on file prior to visit.        Objective   /60   Pulse 73   Temp 98.7 °F (37.1 °C) (Temporal)   Resp 16   Ht 5' 11.65\" (1.82 m)   Wt 69.9 kg (154 lb)   SpO2 99%   BMI 21.09 kg/m²     Physical Exam  Constitutional:       Appearance: Normal appearance. He is well-developed.   HENT:      Head: Normocephalic and atraumatic.      Right Ear: External ear normal.      Left Ear: External ear normal.      Nose: Nose normal.      Mouth/Throat:      Mouth: Mucous membranes are moist.   Eyes:      Conjunctiva/sclera: Conjunctivae normal.      Pupils: Pupils are equal, round, and reactive to light.   Cardiovascular:      Rate and Rhythm: Normal rate and regular rhythm.      Pulses: Normal pulses.      Heart sounds: Normal heart sounds.   Pulmonary:      Effort: Pulmonary effort is normal.      Breath sounds: Normal breath sounds.   Abdominal:      General: Abdomen is flat. Bowel sounds are normal.      Palpations: Abdomen is soft.   Genitourinary:     Penis: Normal.       " Testes: Normal.   Musculoskeletal:         General: Normal range of motion.      Cervical back: Normal range of motion and neck supple.   Skin:     General: Skin is warm and dry.      Capillary Refill: Capillary refill takes less than 2 seconds.   Neurological:      General: No focal deficit present.      Mental Status: He is alert and oriented to person, place, and time. Mental status is at baseline.      Deep Tendon Reflexes: Reflexes are normal and symmetric.   Psychiatric:         Mood and Affect: Mood normal.         Behavior: Behavior normal.         Thought Content: Thought content normal.         Judgment: Judgment normal.      Comments: Anxiety and depression and adhd stable       Administrative Statements   I have spent a total time of 31 minutes in caring for this patient on the day of the visit/encounter including Diagnostic results, Prognosis, Risks and benefits of tx options, Instructions for management, Patient and family education, Importance of tx compliance, Risk factor reductions, Impressions, Counseling / Coordination of care, Documenting in the medical record, Reviewing / ordering tests, medicine, procedures  , and Obtaining or reviewing history  .

## 2025-01-27 NOTE — PATIENT INSTRUCTIONS
Here for general PE and needs updated labs and also rec seeing eye doctor and dentist and rec also to call if any problems and see psychiatry as directed for anxiety and depression and adhd. Use sunscreen and monitor for ticks. Call if any problems.

## 2025-02-10 ENCOUNTER — OFFICE VISIT (OUTPATIENT)
Dept: FAMILY MEDICINE CLINIC | Facility: CLINIC | Age: 25
End: 2025-02-10

## 2025-02-10 VITALS
SYSTOLIC BLOOD PRESSURE: 108 MMHG | WEIGHT: 153 LBS | HEART RATE: 88 BPM | OXYGEN SATURATION: 98 % | BODY MASS INDEX: 20.72 KG/M2 | HEIGHT: 72 IN | TEMPERATURE: 98.7 F | RESPIRATION RATE: 16 BRPM | DIASTOLIC BLOOD PRESSURE: 70 MMHG

## 2025-02-10 DIAGNOSIS — J02.9 SORE THROAT: ICD-10-CM

## 2025-02-10 DIAGNOSIS — R05.9 COUGH, UNSPECIFIED TYPE: Primary | ICD-10-CM

## 2025-02-10 DIAGNOSIS — F90.9 ATTENTION DEFICIT HYPERACTIVITY DISORDER (ADHD), UNSPECIFIED ADHD TYPE: ICD-10-CM

## 2025-02-10 DIAGNOSIS — R52 BODY ACHES: ICD-10-CM

## 2025-02-10 DIAGNOSIS — Z20.822 EXPOSURE TO COVID-19 VIRUS: ICD-10-CM

## 2025-02-10 DIAGNOSIS — F32.2 SEVERE MAJOR DEPRESSIVE DISORDER (HCC): ICD-10-CM

## 2025-02-10 DIAGNOSIS — J10.1 INFLUENZA B: ICD-10-CM

## 2025-02-10 LAB
SARS-COV-2 AG UPPER RESP QL IA: NEGATIVE
SL AMB POCT RAPID FLU A: NEGATIVE
SL AMB POCT RAPID FLU B: POSITIVE
VALID CONTROL: NORMAL

## 2025-02-10 PROCEDURE — 87804 INFLUENZA ASSAY W/OPTIC: CPT | Performed by: FAMILY MEDICINE

## 2025-02-10 PROCEDURE — 99214 OFFICE O/P EST MOD 30 MIN: CPT | Performed by: FAMILY MEDICINE

## 2025-02-10 PROCEDURE — 87811 SARS-COV-2 COVID19 W/OPTIC: CPT | Performed by: FAMILY MEDICINE

## 2025-02-10 RX ORDER — AZITHROMYCIN 250 MG/1
TABLET, FILM COATED ORAL
Qty: 6 TABLET | Refills: 0 | Status: SHIPPED | OUTPATIENT
Start: 2025-02-10 | End: 2025-02-15

## 2025-02-10 RX ORDER — OSELTAMIVIR PHOSPHATE 75 MG/1
75 CAPSULE ORAL EVERY 12 HOURS SCHEDULED
Qty: 10 CAPSULE | Refills: 0 | Status: SHIPPED | OUTPATIENT
Start: 2025-02-10 | End: 2025-02-15

## 2025-02-10 NOTE — PATIENT INSTRUCTIONS
Rest and fluids and start abx and dimetapp dm cold and cough, tested for covid and flu in office. Call if worse. And stay well hydrated. Take Lexapro as directed 10 mg daily and f-up with psychiatry as directed. Patient negative for covid but positive for flu b and will rec starting Tamiflu as directed.

## 2025-02-10 NOTE — PROGRESS NOTES
Name: Bienvenido Mccann      : 2000      MRN: [id number]  Encounter Provider: Chad Nam DO  Encounter Date: 2/10/2025   Encounter department: [facility] PRIMARY CARE  :  Chief Complaint   Patient presents with    Cold Like Symptoms     Cough runny nose sore throat      Patient Instructions   Rest and fluids and start abx and dimetapp dm cold and cough, tested for covid and flu in office. Call if worse. And stay well hydrated. Take Lexapro as directed 10 mg daily and f-up with psychiatry as directed. Patient negative for covid but positive for flu b and will rec starting Tamiflu as directed.     Assessment & Plan  Influenza B    Orders:    oseltamivir (TAMIFLU) 75 mg capsule; Take 1 capsule (75 mg total) by mouth every 12 (twelve) hours for 5 days    Cough, unspecified type  Start abx and dimetapp dm cold and cough prn.   Orders:    azithromycin (Zithromax) 250 mg tablet; Take 2 tablets (500 mg total) by mouth daily for 1 day, THEN 1 tablet (250 mg total) daily for 4 days.    POCT Rapid Covid Ag    POCT rapid flu A and B    Sore throat  Starta bx and use Tylenol prn   Orders:    azithromycin (Zithromax) 250 mg tablet; Take 2 tablets (500 mg total) by mouth daily for 1 day, THEN 1 tablet (250 mg total) daily for 4 days.    Body aches  Tylenol prn pain       Exposure to COVID-19 virus  Test for covid 19 and influenza in office.        Severe major depressive disorder (HCC)  Takes lexapro and sees counselor as directed.          Attention deficit hyperactivity disorder (ADHD), unspecified ADHD type                History of Present Illness   Started 1 week ago with st and cough and body aches and father had covid. Start abx and dimetapp dm cold and cough prn. Stay well hydrated. Tylenol prn body aches and any pain/fever. Still takes lexapro for depression and adderall as directed for adhd and is helping.     Fever - 9 weeks to 74 years   This is a recurrent problem. The current episode started  "in the past 7 days. The problem occurs daily. The problem has been waxing and waning. The maximum temperature noted was 102 to 102.9 F. The temperature was taken using an oral thermometer. Associated symptoms include abdominal pain, chest pain, congestion, coughing, diarrhea, headaches, muscle aches, nausea, a sore throat and wheezing.   Risk factors: hx of cancer, recent sickness and sick contacts    Risk factors: no contaminated food, no contaminated water, no immunosuppression, no occupational exposure and no recent travel      Review of Systems   Constitutional:  Positive for fever.   HENT:  Positive for congestion and sore throat.    Eyes: Negative.    Respiratory:  Positive for cough and wheezing.    Cardiovascular:  Positive for chest pain.   Gastrointestinal:  Positive for abdominal pain, diarrhea and nausea.   Endocrine: Negative.    Genitourinary: Negative.    Musculoskeletal: Negative.    Skin: Negative.    Allergic/Immunologic: Negative.    Neurological:  Positive for headaches.   Hematological: Negative.    Psychiatric/Behavioral: Negative.          Depression and adhd stable on meds       Objective   /70   Pulse 88   Temp 98.7 °F (37.1 °C) (Temporal)   Resp 16   Ht 5' 11.65\" (1.82 m)   Wt 69.4 kg (153 lb)   SpO2 98%   BMI 20.95 kg/m²      Physical Exam  Constitutional:       Appearance: Normal appearance. He is well-developed.   HENT:      Head: Normocephalic and atraumatic.      Right Ear: External ear normal.      Left Ear: External ear normal.      Nose: Nose normal.      Mouth/Throat:      Mouth: Mucous membranes are moist.   Eyes:      Conjunctiva/sclera: Conjunctivae normal.      Pupils: Pupils are equal, round, and reactive to light.   Cardiovascular:      Rate and Rhythm: Normal rate and regular rhythm.      Pulses: Normal pulses.      Heart sounds: Normal heart sounds.   Pulmonary:      Effort: Pulmonary effort is normal.      Breath sounds: Normal breath sounds.      Comments: " cough  Musculoskeletal:         General: Normal range of motion.      Cervical back: Normal range of motion and neck supple.   Skin:     General: Skin is warm and dry.      Capillary Refill: Capillary refill takes less than 2 seconds.   Neurological:      General: No focal deficit present.      Mental Status: He is alert and oriented to person, place, and time. Mental status is at baseline.      Deep Tendon Reflexes: Reflexes are normal and symmetric.   Psychiatric:         Mood and Affect: Mood normal.         Behavior: Behavior normal.         Thought Content: Thought content normal.         Judgment: Judgment normal.      Comments: Depression and adhd stable on meds       Administrative Statements   I have spent a total time of 30 minutes in caring for this patient on the day of the visit/encounter including Diagnostic results, Prognosis, Risks and benefits of tx options, Instructions for management, Patient and family education, Importance of tx compliance, Risk factor reductions, Impressions, Counseling / Coordination of care, Documenting in the medical record, Reviewing / ordering tests, medicine, procedures  , and Obtaining or reviewing history  .

## 2025-07-24 ENCOUNTER — OFFICE VISIT (OUTPATIENT)
Dept: FAMILY MEDICINE CLINIC | Facility: CLINIC | Age: 25
End: 2025-07-24
Payer: COMMERCIAL

## 2025-07-24 VITALS
SYSTOLIC BLOOD PRESSURE: 112 MMHG | DIASTOLIC BLOOD PRESSURE: 70 MMHG | BODY MASS INDEX: 20.58 KG/M2 | HEIGHT: 71 IN | OXYGEN SATURATION: 96 % | HEART RATE: 76 BPM | WEIGHT: 147 LBS | TEMPERATURE: 97.9 F

## 2025-07-24 DIAGNOSIS — R19.5 LOOSE STOOLS: Primary | ICD-10-CM

## 2025-07-24 DIAGNOSIS — F90.9 ATTENTION DEFICIT HYPERACTIVITY DISORDER (ADHD), UNSPECIFIED ADHD TYPE: ICD-10-CM

## 2025-07-24 DIAGNOSIS — F32.2 SEVERE MAJOR DEPRESSIVE DISORDER (HCC): ICD-10-CM

## 2025-07-24 DIAGNOSIS — R19.4 INCREASED BOWEL FREQUENCY: ICD-10-CM

## 2025-07-24 PROCEDURE — 99214 OFFICE O/P EST MOD 30 MIN: CPT | Performed by: FAMILY MEDICINE

## 2025-07-24 RX ORDER — CITALOPRAM HYDROBROMIDE 10 MG/1
TABLET ORAL
COMMUNITY
Start: 2025-07-19

## 2025-07-24 NOTE — PATIENT INSTRUCTIONS
Consult GI for frequency bowels and loose stools. Possibly related to meds such as adderall XR being takin in am. Trial off adderall XR and see if frequency of BM's and loose stools decrease.

## 2025-07-24 NOTE — ASSESSMENT & PLAN NOTE
Depression Screening Follow-up Plan: Patient's depression screening was positive with a PHQ-9 score of 16. Patient with underlying depression and was advised to continue current medications as prescribed.

## 2025-07-24 NOTE — PROGRESS NOTES
Name: Bienvenido Mccann      : 2000      MRN: [id number]  Encounter Provider: Chad Nam DO  Encounter Date: 2025   Encounter department: [facility] PRIMARY CARE  Chief Complaint   Patient presents with   • bowel     Patient coming in for bowel issues been on and off     Patient Instructions   Consult GI for frequency bowels and loose stools. Possibly related to meds such as adderall XR being takin in am. Trial off adderall XR and see if frequency of BM's and loose stools decrease.     Assessment & Plan  Loose stools  Monitor for association with Adderall XR being used in am.   Orders:  •  Ambulatory Referral to Gastroenterology; Future    Increased bowel frequency  May be related to adderall XR being used in am  Orders:  •  Ambulatory Referral to Gastroenterology; Future    Severe major depressive disorder (HCC)  Depression Screening Follow-up Plan: Patient's depression screening was positive with a PHQ-9 score of 16. Patient with underlying depression and was advised to continue current medications as prescribed.         Attention deficit hyperactivity disorder (ADHD), unspecified ADHD type  stable            History of Present Illness     Frequent BM's in AM and this started 1 year ago. No blood in stool and no abdominal pain.       Review of Systems   Constitutional: Negative.    HENT: Negative.     Eyes: Negative.    Respiratory: Negative.     Cardiovascular: Negative.    Gastrointestinal:  Negative for abdominal distention, abdominal pain, anal bleeding and blood in stool.        Frequent BM's loose stools   Endocrine: Negative.    Genitourinary: Negative.    Musculoskeletal: Negative.    Skin: Negative.    Allergic/Immunologic: Negative.    Neurological: Negative.    Hematological: Negative.    Psychiatric/Behavioral: Negative.       Past Medical History[1]  Past Surgical History[1]  Family History[1]  Social History[1]  Medications[1]  No Known Allergies  Immunization History  "  Administered Date(s) Administered   • COVID-19 MODERNA VACC 0.5 ML IM 08/27/2021   • DTaP 5 08/03/2004   • H1N1, All Formulations 11/20/2009   • IPV 08/03/2004   • Meningococcal, Unknown Serogroups 03/03/2011   • Tdap 03/03/2011   • Varicella 04/02/2009     Objective   /70 (BP Location: Left arm, Patient Position: Sitting, Cuff Size: Standard)   Pulse 76   Temp 97.9 °F (36.6 °C) (Temporal)   Ht 5' 11\" (1.803 m)   Wt 66.7 kg (147 lb)   SpO2 96%   BMI 20.50 kg/m²     Physical Exam  Constitutional:       Appearance: Normal appearance. He is well-developed.   HENT:      Head: Normocephalic and atraumatic.      Right Ear: External ear normal.      Left Ear: External ear normal.      Nose: Nose normal.      Mouth/Throat:      Mouth: Mucous membranes are moist.     Eyes:      Conjunctiva/sclera: Conjunctivae normal.      Pupils: Pupils are equal, round, and reactive to light.       Cardiovascular:      Rate and Rhythm: Normal rate and regular rhythm.      Pulses: Normal pulses.      Heart sounds: Normal heart sounds.   Pulmonary:      Effort: Pulmonary effort is normal.      Breath sounds: Normal breath sounds.   Abdominal:      General: Abdomen is flat. There is no distension.      Palpations: Abdomen is soft. There is no mass.      Tenderness: There is no abdominal tenderness. There is no guarding or rebound.      Hernia: No hernia is present.     Musculoskeletal:         General: Normal range of motion.      Cervical back: Normal range of motion and neck supple.     Skin:     General: Skin is warm and dry.      Capillary Refill: Capillary refill takes less than 2 seconds.     Neurological:      General: No focal deficit present.      Mental Status: He is alert and oriented to person, place, and time. Mental status is at baseline.      Deep Tendon Reflexes: Reflexes are normal and symmetric.     Psychiatric:         Mood and Affect: Mood normal.         Behavior: Behavior normal.         Thought Content: " Thought content normal.         Judgment: Judgment normal.       Administrative Statements   I have spent a total time of 30 minutes in caring for this patient on the day of the visit/encounter including Diagnostic results, Prognosis, Risks and benefits of tx options, Instructions for management, Patient and family education, Importance of tx compliance, Risk factor reductions, Impressions, Counseling / Coordination of care, Documenting in the medical record, Reviewing/placing orders in the medical record (including tests, medications, and/or procedures), and Obtaining or reviewing history  .       [1]  Past Medical History:  Diagnosis Date   • ADHD    [1]  No past surgical history on file.[1]  Family History  Problem Relation Name Age of Onset   • No Known Problems Mother     • No Known Problems Father     [1]  Social History  Tobacco Use   • Smoking status: Never   • Smokeless tobacco: Never   Vaping Use   • Vaping status: Never Used   Substance and Sexual Activity   • Alcohol use: No   • Drug use: No   • Sexual activity: Yes   [1]  Current Outpatient Medications on File Prior to Visit   Medication Sig   • amphetamine-dextroamphetamine (ADDERALL XR) 20 MG 24 hr capsule Take 1 capsule (20 mg total) by mouth every morning Max Daily Amount: 20 mg   • citalopram (CeleXA) 10 mg tablet    • hydrOXYzine HCL (ATARAX) 25 mg tablet    • escitalopram (LEXAPRO) 10 mg tablet Take 1 tablet by mouth in the morning (Patient not taking: Reported on 7/24/2025)